# Patient Record
Sex: MALE | Race: BLACK OR AFRICAN AMERICAN | NOT HISPANIC OR LATINO | Employment: FULL TIME | URBAN - METROPOLITAN AREA
[De-identification: names, ages, dates, MRNs, and addresses within clinical notes are randomized per-mention and may not be internally consistent; named-entity substitution may affect disease eponyms.]

---

## 2017-04-13 ENCOUNTER — HOSPITAL ENCOUNTER (INPATIENT)
Facility: HOSPITAL | Age: 52
LOS: 3 days | Discharge: HOME | DRG: 896 | End: 2017-04-17
Attending: EMERGENCY MEDICINE | Admitting: HOSPITALIST
Payer: MEDICAID

## 2017-04-13 DIAGNOSIS — J81.0 ACUTE PULMONARY EDEMA: Primary | ICD-10-CM

## 2017-04-13 DIAGNOSIS — F14.929 COCAINE INTOXICATION WITH COMPLICATION: ICD-10-CM

## 2017-04-13 DIAGNOSIS — J96.02 ACUTE RESPIRATORY FAILURE WITH HYPOXIA AND HYPERCAPNIA: ICD-10-CM

## 2017-04-13 DIAGNOSIS — I50.20 SYSTOLIC CONGESTIVE HEART FAILURE: ICD-10-CM

## 2017-04-13 DIAGNOSIS — F14.929: ICD-10-CM

## 2017-04-13 DIAGNOSIS — F32.A DEPRESSION, UNSPECIFIED DEPRESSION TYPE: ICD-10-CM

## 2017-04-13 DIAGNOSIS — I50.9 ACUTE CONGESTIVE HEART FAILURE, UNSPECIFIED CONGESTIVE HEART FAILURE TYPE: ICD-10-CM

## 2017-04-13 DIAGNOSIS — N17.9 ACUTE KIDNEY INJURY: ICD-10-CM

## 2017-04-13 DIAGNOSIS — J96.02 ACUTE HYPERCAPNIC RESPIRATORY FAILURE: ICD-10-CM

## 2017-04-13 DIAGNOSIS — T88.4XXA: ICD-10-CM

## 2017-04-13 DIAGNOSIS — J96.01 ACUTE RESPIRATORY FAILURE WITH HYPOXIA AND HYPERCAPNIA: ICD-10-CM

## 2017-04-13 LAB
ABO + RH BLD: NORMAL
ALBUMIN SERPL BCP-MCNC: 3.7 G/DL
ALLENS TEST: ABNORMAL
ALLENS TEST: ABNORMAL
ALP SERPL-CCNC: 146 U/L
ALT SERPL W/O P-5'-P-CCNC: 127 U/L
AMPHET+METHAMPHET UR QL: NEGATIVE
ANION GAP SERPL CALC-SCNC: 14 MMOL/L
AST SERPL-CCNC: 193 U/L
BARBITURATES UR QL SCN>200 NG/ML: NEGATIVE
BASOPHILS # BLD AUTO: 0.07 K/UL
BASOPHILS NFR BLD: 0.6 %
BENZODIAZ UR QL SCN>200 NG/ML: NEGATIVE
BILIRUB SERPL-MCNC: 0.4 MG/DL
BLD GP AB SCN CELLS X3 SERPL QL: NORMAL
BNP SERPL-MCNC: 298 PG/ML
BUN SERPL-MCNC: 21 MG/DL
BZE UR QL SCN: NORMAL
CALCIUM SERPL-MCNC: 8.6 MG/DL
CANNABINOIDS UR QL SCN: NORMAL
CHLORIDE SERPL-SCNC: 108 MMOL/L
CO2 SERPL-SCNC: 19 MMOL/L
CREAT SERPL-MCNC: 1.7 MG/DL
CREAT UR-MCNC: 202.6 MG/DL
DELSYS: ABNORMAL
DELSYS: ABNORMAL
DIFFERENTIAL METHOD: ABNORMAL
EOSINOPHIL # BLD AUTO: 0.2 K/UL
EOSINOPHIL NFR BLD: 1.9 %
ERYTHROCYTE [DISTWIDTH] IN BLOOD BY AUTOMATED COUNT: 15.9 %
ERYTHROCYTE [SEDIMENTATION RATE] IN BLOOD BY WESTERGREN METHOD: 22 MM/H
EST. GFR  (AFRICAN AMERICAN): 52 ML/MIN/1.73 M^2
EST. GFR  (NON AFRICAN AMERICAN): 45 ML/MIN/1.73 M^2
ETHANOL SERPL-MCNC: <10 MG/DL
FIO2: 100
GLUCOSE SERPL-MCNC: 211 MG/DL
HCO3 UR-SCNC: 25.1 MMOL/L (ref 24–28)
HCO3 UR-SCNC: 27.3 MMOL/L (ref 24–28)
HCT VFR BLD AUTO: 40.8 %
HGB BLD-MCNC: 13.4 G/DL
INR PPP: 1
LYMPHOCYTES # BLD AUTO: 3.9 K/UL
LYMPHOCYTES NFR BLD: 30.9 %
MAGNESIUM SERPL-MCNC: 2 MG/DL
MCH RBC QN AUTO: 29.8 PG
MCHC RBC AUTO-ENTMCNC: 32.8 %
MCV RBC AUTO: 91 FL
METHADONE UR QL SCN>300 NG/ML: NEGATIVE
MIN VOL: 11.8
MODE: ABNORMAL
MONOCYTES # BLD AUTO: 0.9 K/UL
MONOCYTES NFR BLD: 6.9 %
NEUTROPHILS # BLD AUTO: 7.5 K/UL
NEUTROPHILS NFR BLD: 59.3 %
OPIATES UR QL SCN: NEGATIVE
PCO2 BLDA: 82.6 MMHG (ref 35–45)
PCO2 BLDA: 82.9 MMHG (ref 35–45)
PCP UR QL SCN>25 NG/ML: NEGATIVE
PEEP: 8
PH SMN: 7.09 [PH] (ref 7.35–7.45)
PH SMN: 7.13 [PH] (ref 7.35–7.45)
PHOSPHATE SERPL-MCNC: 5.7 MG/DL
PIP: 38
PLATELET # BLD AUTO: 326 K/UL
PMV BLD AUTO: 9.9 FL
PO2 BLDA: 149 MMHG (ref 80–100)
PO2 BLDA: 156 MMHG (ref 80–100)
POC BE: -2 MMOL/L
POC BE: -5 MMOL/L
POC SATURATED O2: 98 % (ref 95–100)
POC SATURATED O2: 98 % (ref 95–100)
POC TCO2: 28 MMOL/L (ref 23–27)
POC TCO2: 30 MMOL/L (ref 23–27)
POTASSIUM SERPL-SCNC: 4.2 MMOL/L
PROT SERPL-MCNC: 7.9 G/DL
PROTHROMBIN TIME: 10.5 SEC
RBC # BLD AUTO: 4.49 M/UL
SAMPLE: ABNORMAL
SAMPLE: ABNORMAL
SITE: ABNORMAL
SITE: ABNORMAL
SODIUM SERPL-SCNC: 141 MMOL/L
TOXICOLOGY INFORMATION: NORMAL
TROPONIN I SERPL DL<=0.01 NG/ML-MCNC: 0.1 NG/ML
TSH SERPL DL<=0.005 MIU/L-ACNC: 2.34 UIU/ML
VT: 400
WBC # BLD AUTO: 12.59 K/UL

## 2017-04-13 PROCEDURE — 83880 ASSAY OF NATRIURETIC PEPTIDE: CPT

## 2017-04-13 PROCEDURE — 85025 COMPLETE CBC W/AUTO DIFF WBC: CPT

## 2017-04-13 PROCEDURE — 96367 TX/PROPH/DG ADDL SEQ IV INF: CPT

## 2017-04-13 PROCEDURE — 84100 ASSAY OF PHOSPHORUS: CPT

## 2017-04-13 PROCEDURE — 82962 GLUCOSE BLOOD TEST: CPT

## 2017-04-13 PROCEDURE — 84443 ASSAY THYROID STIM HORMONE: CPT

## 2017-04-13 PROCEDURE — 84484 ASSAY OF TROPONIN QUANT: CPT

## 2017-04-13 PROCEDURE — 63600175 PHARM REV CODE 636 W HCPCS: Performed by: EMERGENCY MEDICINE

## 2017-04-13 PROCEDURE — 80320 DRUG SCREEN QUANTALCOHOLS: CPT

## 2017-04-13 PROCEDURE — 20000000 HC ICU ROOM

## 2017-04-13 PROCEDURE — 99291 CRITICAL CARE FIRST HOUR: CPT | Mod: 25

## 2017-04-13 PROCEDURE — 86900 BLOOD TYPING SEROLOGIC ABO: CPT

## 2017-04-13 PROCEDURE — 86901 BLOOD TYPING SEROLOGIC RH(D): CPT

## 2017-04-13 PROCEDURE — 83735 ASSAY OF MAGNESIUM: CPT

## 2017-04-13 PROCEDURE — 82570 ASSAY OF URINE CREATININE: CPT

## 2017-04-13 PROCEDURE — 96365 THER/PROPH/DIAG IV INF INIT: CPT

## 2017-04-13 PROCEDURE — 25000003 PHARM REV CODE 250: Performed by: EMERGENCY MEDICINE

## 2017-04-13 PROCEDURE — 80053 COMPREHEN METABOLIC PANEL: CPT

## 2017-04-13 PROCEDURE — 85610 PROTHROMBIN TIME: CPT

## 2017-04-13 PROCEDURE — 87040 BLOOD CULTURE FOR BACTERIA: CPT

## 2017-04-13 PROCEDURE — 96366 THER/PROPH/DIAG IV INF ADDON: CPT

## 2017-04-13 PROCEDURE — 96375 TX/PRO/DX INJ NEW DRUG ADDON: CPT

## 2017-04-13 PROCEDURE — 80307 DRUG TEST PRSMV CHEM ANLYZR: CPT

## 2017-04-13 PROCEDURE — 31500 INSERT EMERGENCY AIRWAY: CPT

## 2017-04-13 PROCEDURE — 94002 VENT MGMT INPAT INIT DAY: CPT

## 2017-04-13 PROCEDURE — 63600175 PHARM REV CODE 636 W HCPCS

## 2017-04-13 PROCEDURE — 96376 TX/PRO/DX INJ SAME DRUG ADON: CPT

## 2017-04-13 RX ORDER — FENTANYL CITRATE 50 UG/ML
INJECTION, SOLUTION INTRAMUSCULAR; INTRAVENOUS
Status: DISPENSED
Start: 2017-04-13 | End: 2017-04-14

## 2017-04-13 RX ORDER — PROPOFOL 10 MG/ML
100 VIAL (ML) INTRAVENOUS
Status: COMPLETED | OUTPATIENT
Start: 2017-04-13 | End: 2017-04-13

## 2017-04-13 RX ORDER — FENTANYL CITRATE 50 UG/ML
INJECTION, SOLUTION INTRAMUSCULAR; INTRAVENOUS
Status: COMPLETED
Start: 2017-04-13 | End: 2017-04-13

## 2017-04-13 RX ORDER — FENTANYL CITRATE 50 UG/ML
100 INJECTION, SOLUTION INTRAMUSCULAR; INTRAVENOUS
Status: COMPLETED | OUTPATIENT
Start: 2017-04-13 | End: 2017-04-13

## 2017-04-13 RX ORDER — ROCURONIUM BROMIDE 10 MG/ML
80 INJECTION, SOLUTION INTRAVENOUS ONCE
Status: COMPLETED | OUTPATIENT
Start: 2017-04-13 | End: 2017-04-13

## 2017-04-13 RX ORDER — PROPOFOL 10 MG/ML
10 INJECTION, EMULSION INTRAVENOUS
Status: COMPLETED | OUTPATIENT
Start: 2017-04-13 | End: 2017-04-13

## 2017-04-13 RX ADMIN — ROCURONIUM BROMIDE 50 MG: 10 INJECTION, SOLUTION INTRAVENOUS at 10:04

## 2017-04-13 RX ADMIN — PROPOFOL 10 MCG/KG/MIN: 10 INJECTION, EMULSION INTRAVENOUS at 09:04

## 2017-04-13 RX ADMIN — MIDAZOLAM 2 MG/HR: 5 INJECTION INTRAMUSCULAR; INTRAVENOUS at 10:04

## 2017-04-13 RX ADMIN — NITROGLYCERIN 2 INCH: 20 OINTMENT TOPICAL at 11:04

## 2017-04-13 RX ADMIN — FENTANYL CITRATE 100 MCG: 50 INJECTION, SOLUTION INTRAMUSCULAR; INTRAVENOUS at 11:04

## 2017-04-13 RX ADMIN — PROPOFOL 100 MG: 10 INJECTION, EMULSION INTRAVENOUS at 09:04

## 2017-04-13 NOTE — IP AVS SNAPSHOT
\[facility]\""  [address]  [place] [address]  Phone: [phone]           Patient Discharge Instructions   Our goal is to set you up for success. This packet includes information on your condition, medications, and your home care.  It will help you care for yourself to prevent having to return to the hospital.     Please ask your nurse if you have any questions.      There are many details to remember when preparing to leave the hospital. Here is what you will need to do:    1. Take your medicine. If you are prescribed medications, review your Medication List on the following pages. You may have new medications to  at the pharmacy and others that you'll need to stop taking. Review the instructions for how and when to take your medications. Talk with your doctor or nurses if you are unsure of what to do.     2. Go to your follow-up appointments. Specific follow-up information is listed in the following pages. Your may be contacted by a nurse or clinical provider about future appointments. Be sure we have all of the phone numbers to reach you. Please contact your provider's office if you are unable to make an appointment.     3. Watch for warning signs. Your doctor or nurse will give you detailed warning signs to watch for and when to call for assistance. These instructions may also include educational information about your condition. If you experience any of warning signs to your health, call your doctor.           [facility]  Unless otherwise directed by your provider, please   contact [facility], our nurse care line   that is available for 24/7 assistance.     [phone] (toll-free)     Registered nurses in the [facility]   provide: appointment scheduling, clinical advisement, health education, and other advisory services.                  ** Verify the list of medication(s) below is accurate and up to date. Carry this with you in case of emergency. If your  medications have changed, please notify your healthcare provider.             Medication List      START taking these medications        Additional Info                      carvedilol 6.25 MG tablet   Commonly known as:  COREG   Quantity:  60 tablet   Refills:  3   Dose:  6.25 mg    Last time this was given:  6.25 mg on 4/17/2017  8:24 AM   Instructions:  Take 1 tablet (6.25 mg total) by mouth 2 (two) times daily.     Begin Date    AM    Noon    PM    Bedtime       furosemide 40 MG tablet   Commonly known as:  LASIX   Quantity:  30 tablet   Refills:  3   Dose:  40 mg    Last time this was given:  40 mg on 4/17/2017  8:24 AM   Instructions:  Take 1 tablet (40 mg total) by mouth once daily.     Begin Date    AM    Noon    PM    Bedtime       lisinopril 2.5 MG tablet   Commonly known as:  PRINIVIL,ZESTRIL   Quantity:  30 tablet   Refills:  3   Dose:  2.5 mg    Instructions:  Take 1 tablet (2.5 mg total) by mouth once daily.     Begin Date    AM    Noon    PM    Bedtime       sertraline 50 MG tablet   Commonly known as:  ZOLOFT   Quantity:  30 tablet   Refills:  3   Dose:  50 mg    Start Date:  4/18/2017   Last time this was given:  25 mg on 4/17/2017  8:24 AM   Instructions:  Take 1 tablet (50 mg total) by mouth once daily.     Begin Date    AM    Noon    PM    Bedtime            Where to Get Your Medications      These medications were sent to [facility] - [address] [facility] [address]  [address] [facility] [address], [place] [address]     Phone:  [phone]     carvedilol 6.25 MG tablet    furosemide 40 MG tablet    lisinopril 2.5 MG tablet    sertraline 50 MG tablet                  Please bring to all follow up appointments:    1. A copy of your discharge instructions.  2. All medicines you are currently taking in their original bottles.  3. Identification and insurance card.    Please arrive 15 minutes ahead of scheduled appointment time.    Please call 24 hours in advance if  you must reschedule your appointment and/or time.        Follow-up Information     Follow up with [facility] On 4/20/2017.    Why:  @10:20am    Contact information:    [address]  [facility] [id number] [phone]          Discharge Instructions     Future Orders    Activity as tolerated     Call MD for:  difficulty breathing or increased cough     Call MD for:  increased confusion or weakness     Call MD for:  persistent dizziness, light-headedness, or visual disturbances     Call MD for:  persistent nausea and vomiting or diarrhea     Call MD for:  severe uncontrolled pain     Diet general     Questions:    Total calories:      Fat restriction, if any:      Protein restriction, if any:      Na restriction, if any:  2gNa    Fluid restriction:      Additional restrictions:  Cardiac (Low Na/Chol)        Discharge Instructions       HEART FAILURE, DISCHARGE INSTRUCTIONS FOR (ENGLISH) View Edit Remove   HEART FAILURE, COPING WITH (ENGLISH) View Edit Remove   HEART FAILURE: WARNING SIGNS OF A FLARE-UP (ENGLISH) View Edit Remove   SERTRALINE TABLETS (ENGLISH) View Edit Remove   FUROSEMIDE TABLETS (ENGLISH) View Edit Remove   CARVEDILOL TABLETS (ENGLISH) View Edit Remove   LISINOPRIL TABLETS (ENGLISH) View Edit Remove         Discharge References/Attachments     HEART FAILURE, DISCHARGE INSTRUCTIONS FOR (ENGLISH)    HEART FAILURE, COPING WITH (ENGLISH)    HEART FAILURE: WARNING SIGNS OF A FLARE-UP (ENGLISH)    SERTRALINE TABLETS (ENGLISH)    FUROSEMIDE TABLETS (ENGLISH)    CARVEDILOL TABLETS (ENGLISH)    LISINOPRIL TABLETS (ENGLISH)        Primary Diagnosis     Your primary diagnosis was:  Cocaine Intoxication With Complication      Admission Information     Date & Time Provider Department [facility]    4/13/2017  8:46 PM Russel Blanchard MD [facility] [id number]      Care Providers     Provider Role Specialty Primary office phone    Russel Blanchard MD Attending Provider  "Hospitalist [phone]    Aleks Macias MD Physician  Hospitalist  [phone]    Robin Chandra MD Consulting Physician  Cardiology [phone]    Balbir Vivas MD Consulting Physician  Pulmonary Disease [phone]      Your Vitals Were     BP Pulse Temp Resp Height Weight    117/84 90 98.3 °F (36.8 °C) 19 5' 8" (1.727 m) 67.3 kg (148 lb 6.4 oz)    SpO2 BMI             95% 22.56 kg/m2         Recent Lab Values     No lab values to display.      Pending Labs     Order Current Status    Blood culture x two cultures. Draw prior to antibiotics. Preliminary result    Blood culture x two cultures. Draw prior to antibiotics. Preliminary result      Allergies as of 4/17/2017     No Known Allergies      Advance Directives     An advance directive is a document which, in the event you are no longer able to make decisions for yourself, tells your healthcare team what kind of treatment you do or do not want to receive, or who you would like to make those decisions for you.  If you do not currently have an advance directive, [facility] encourages you to create one.  For more information call:  [phone]), [phone] ([phone]),  or log on to [email or web address].        Smoking Cessation     If you would like to quit smoking:   You may be eligible for free services if you are a [place] resident and started smoking cigarettes before September 1, 1988.  Call the Smoking Cessation Trust ([facility]) toll free at ([phone] or [phone] 272-1551.   Call -800-QUIT-NOW if you do not meet the above criteria.   Contact us via email: [email or web address]   View our website for more information: [email or web address]        Language Assistance Services     ATTENTION: Language assistance services are available, free of charge. Please call [phone].      ATENCIÓN: Si habla español, tiene a gutierrez disposición servicios gratuitos de asistencia lingüística. Llame al [phone].     CHÚ " Ý: N?u b?n nói Ti?ng Vi?t, có các d?ch v? h? tr? ngôn ng? mi?n phí dành cho b?n. G?i s? [phone].        Heart Failure Education       Heart Failure: Being Active  You have a condition called heart failure. Being active doesnt mean that you have to wear yourself out. Even a little movement each day helps to strengthen your heart. If you cant get out to exercise, you can do simple stretching and strengthening exercises at home. These are good ways to keep you well-conditioned and prevent you and your heart from becoming excessively weak.    Ideas to get you started  · Add a little movement to things you do now. Walk to mail letters. Park your car at the far end of the parking lot and walk to the store. Walk up a flight of stairs instead of taking the elevator.  · Choose activities you enjoy. You might walk, swim, or ride an exercise bike. Things like gardening and washing the car count, too. Other possibilities include: washing dishes, walking the dog, walking around the mall, and doing aerobic activities with friends.  · Join a group exercise program at a [facility] or [facility], a senior center, or a community center. Or look into a hospital cardiac rehabilitation program. Ask your doctor if you qualify.  Tips to keep you going  · Get up and get dressed each day. Go to a coffee shop and read a newspaper or go somewhere that you'll be in the presence of other active people. Youll feel more like being active.  · Make a plan. Choose one or more activities that you enjoy and that you can easily do. Then plan to do at least one each day. You might write your plan on a calendar.  · Go with a friend or a group if you like company. This can help you feel supported and stay motivated, too.  · Plan social events that you enjoy. This will keep you mentally engaged as well as physically motivated to do things you find pleasure in.  For your safety  · Talk with your healthcare provider before starting an exercise  program.  · Exercise indoors when its too hot or too cold outside, or when the air quality is poor. Try walking at a shopping mall.  · Wear socks and sturdy shoes to maintain your balance and prevent falls.  · Start slowly. Do a few minutes several times a day at first. Increase your time and speed little by little.  · Stop and rest whenever you feel tired or get short of breath.  · Dont push yourself on days when you dont feel well.  Date Last Reviewed: 3/20/2016  © 4952-5515 "[employer]. [address] [place] [address]. All rights reserved. This information is not intended as a substitute for professional medical care. Always follow your healthcare professional's instructions.              Heart Failure: Evaluating Your Heart  You have a condition called heart failure. To evaluate your condition, your doctor will examine you, ask questions, and do some tests. Along with looking for signs of heart failure, the doctor looks for any other health problems that may have led to heart failure. The results of your evaluation will help your doctor form a treatment plan.  Health history and physical exam  Your visit will start with a health history. Tell the doctor about any symptoms youve noticed and about all medicines you take. Then youll have a physical exam. This includes listening to your heartbeat and breathing. Youll also be checked for swelling (edema) in your legs and neck. When you have fluid buildup or fluid in the lungs, it may be called congestive heart failure.  Diagnosing heart failure     During an echocardiogram, sound waves bounce off the heart. These are converted into a picture on the screen.   The following may be done to help your doctor form a diagnosis:  · X-rays show the size and shape of your heart. These pictures can also show fluid in your lungs.  · An electrocardiogram (ECG or EKG) shows the pattern of your heartbeat. Small pads (electrodes) are placed on your  chest, arms, and legs. Wires connect the pads to the ECG machine, which records your hearts electrical signals. This can give the doctor information about heart function.  · An echocardiogram uses ultrasound waves to show the structure and movement of your heart muscle. This shows how well the heart pumps. It also shows the thickness of the heart walls, and if the heart is enlarged. It is one of the most useful, non-invasive tests as it provides information about the heart's general function. This helps your doctor make treatment decisions.  · Lab tests evaluate small amounts of blood or urine for signs of problems. A BNP lab test can help diagnose and evaluate heart failure. BNP stands for B-type natriuretic peptide. The ventricles secrete more BNP when heart failure worsens. Lab tests can also provide information about metabolic dysfunction or heart dysfunction.  Your treatment plan  Based on the results of your evaluation and tests, your doctor will develop a treatment plan. This plan is designed to relieve some of your heart failure symptoms and help make you more comfortable. Your treatment plan may include:  · Medicine to help your heart work better and improve your quality of life  · Changes in what you eat and drink to help prevent fluid from backing up in your body  · Daily monitoring of your weight and heart failure symptoms to see how well your treatment plan is working  · Exercise to help you stay healthy  · Help with quitting smoking  · Emotional and psychological support to help adjust to the changes  · Referrals to other specialists to make sure you are being treated comprehensively  Date Last Reviewed: 3/21/2016  © 5643-9046 [employer]. [address] [place] [address], [place] [address]. All rights reserved. This information is not intended as a substitute for professional medical care. Always follow your healthcare professional's instructions.              Heart Failure: Making Changes to  Your Diet  You have a condition called heart failure. When you have heart failure, excess fluid is more likely to build up in your body because your heart isn't working well. This makes the heart work harder to pump blood. Fluid buildup causes symptoms such as shortness of breath and swelling (edema). This is often referred to as congestive heart failure or CHF. Controlling the amount of salt (sodium) you eat may help stop fluid from building up. Your doctor may also tell you to reduce the amount of fluid you drink.  Reading food labels    Your healthcare provider will tell you how much sodium you can eat each day. Read food labels to keep track. Keep in mind that certain foods are high in salt. These include canned, frozen, and processed foods. Check the amount of sodium in each serving. Watch out for high-sodium ingredients. These include MSG (monosodium glutamate), baking soda, and sodium phosphate.   Eating less salt  Give yourself time to get used to eating less salt. It may take a little while. Here are some tips to help:  · Take the saltshaker off the table. Replace it with salt-free herb mixes and spices.  · Eat fresh or plain frozen vegetables. These have much less salt than canned vegetables.  · Choose low-sodium snacks like sodium-free pretzels, crackers, or air-popped popcorn.  · Dont add salt to your food when youre cooking. Instead, season your foods with pepper, lemon, garlic, or onion.  · When you eat out, ask that your food be cooked without added salt.  · Avoid eating fried foods as these often have a great deal of salt.  If youre told to limit fluids  You may need to limit how much fluid you have to help prevent swelling. This includes anything that is liquid at room temperature, such as ice cream and soup. If your doctor tells you to limit fluid, try these tips:  · Measure drinks in a measuring cup before you drink them. This will help you meet daily goals.  · Chill drinks to make them more  refreshing.  · Suck on frozen lemon wedges to quench thirst.  · Only drink when youre thirsty.  · Chew sugarless gum or suck on hard candy to keep your mouth moist.  · Weigh yourself daily to know if your body's fluid content is rising.  My sodium goal  Your healthcare provider may give you a sodium goal to meet each day. This includes sodium found in food as well as salt that you add. My goal is to eat no more than ___________ mg of sodium per day.     When to call your doctor  Call your doctor right away if you have any symptoms of worsening heart failure. These can include:  · Sudden weight gain  · Increased swelling of your legs or ankles  · Trouble breathing when youre resting or at night  · Increase in the number of pillows you have to sleep on  · Chest pain, pressure, discomfort, or pain in the jaw, neck, or back   Date Last Reviewed: 3/21/2016  © 2433-1611 [employer]. [address] [place] [address]. All rights reserved. This information is not intended as a substitute for professional medical care. Always follow your healthcare professional's instructions.              Heart Failure: Medicines to Help Your Heart    You have a condition called heart failure (also known as congestive heart failure, or CHF). Your doctor will likely prescribe medicines for heart failure and any underlying health problems you have. Most heart failure patients take one or more types of medicinen. Your healthcare provider will work to find the combination of medicines that works best for you.  Heart failure medicines  Here are the most common heart failure medicines:  · ACE inhibitors lower blood pressure and decrease strain on the heart. This makes it easier for the heart to pump. Angiotensin receptor blockers have similar effects. These are prescribed for some patients instead of ACE inhibitors.  · Beta-blockers relieve stress on the heart. They also improve symptoms. They may also improve the heart's  pumping action over time.  · Diuretics (also called water pills) help rid your body of excess water. This can help rid your body of swelling (edema). Having less fluid to pump means your heart doesnt have to work as hard. Some diuretics make your body lose a mineral called potassium. Your doctor will tell you if you need to take supplements or eat more foods high in potassium.  · Digoxin helps your heart pump with more strength. This helps your heart pump more blood with each beat. So, more oxygen-rich blood travels to the rest of the body.  · Aldosterone antagonists help alter hormones and decrease strain on the heart.  · Hydralazine and nitrates are two separate medicines used together to treat heart failure. They may come in one combination pill. They lower blood pressure and decrease how hard the heart has to pump.  Medicines for related conditions  Controlling other heart problems helps keep heart failure under control, too. Depending on other heart problems you have, medicines may be prescribed to:  · Lower blood pressure (antihypertensives).  · Lower cholesterol levels (statins).  · Prevent blood clots (anticoagulants or aspirin).  · Keep the heartbeat steady (antiarrhythmics).  Date Last Reviewed: 3/5/2016  © 5370-6580 [employer]. [address] [place] [address], [place] [address]. All rights reserved. This information is not intended as a substitute for professional medical care. Always follow your healthcare professional's instructions.              Heart Failure: Procedures That May Help    The heart is a muscle that pumps oxygen-rich blood to all parts of the body. When you have heart failure, the heart is not able to pump as well as it should. Blood and fluid may back up into the lungs (congestive heart failure), and some parts of the body dont get enough oxygen-rich blood to work normally. These problems lead to the symptoms of heart failure.     Certain procedures may help the heart pump  better in some cases of heart failure. Some procedures are done to treat health problems that may have caused the heart failure such as coronary artery disease or heart rhythm problems. For more serious heart failure, other options are available.  Treating artery and valve problems  If you have coronary artery disease or valve disease, procedures may be done to improve blood flow. This helps the heart pump better, which can improve heart failure symptoms. First, your doctor may do a cardiac catheterization to help detect clogged blood vessels or valve damage. During this procedure, a  thin tube (catheter) in inserted into a blood vessel and guided to the heart. There a dye is injected and a special type of X-ray (angiogram) is taken of the blood vessels. Procedures to open a blocked artery or fix damaged valves can also be done using catheterization.  · Angioplasty uses a balloon-tipped instrument at the end of the catheter. The balloon is inflated to widen the narrowed artery. In many cases, a stent is expanded to further support the narrowed artery. A stent is a metal mesh tube.  · Valve surgery repairs or replacement of faulty valves can also be done during catheterization so blood can flow properly through the chambers of the heart.  Bypass surgery is another option to help treat blocked arteries. It uses a healthy blood vessel from elsewhere in the body. The healthy blood vessel is attached above and below the blocked area so that blood can flow around the blocked artery.  Treating heart rhythm problems  A device may be placed in the chest to help a weak heart maintain a healthy, heartbeat so the heart can pump more effectively:  · Pacemaker. A pacemaker is an implanted device that regulates your heartbeat electronically. It monitors your heart's rhythm and generates a painless electric impulse that helps the heart beat in a regular rhythm. A pacemaker is programmed to meet your specific heart rhythm  needs.  · Biventricular pacing/cardiac resynchronization therapy. A type of pacemaker that paces both pumping chambers of the heart at the same time to coordinate contractions and to improve the heart's function. Some people with heart failure are candidates for this therapy.  · Implantable cardioverter defibrillator. A device similar to a pacemaker that senses when the heart is beating too fast and delivers an electrical shock to convert the fast rhythm to a normal rhythm. This can be a life saving device.  In severe cases  In more serious cases of heart failure when other treatments no longer work, other options may include:  · Ventricular assist devices (VADs). These are mechanical devices used to take over the pumping function for one or both of the heart's ventricles, or pumping chambers. A VAD may be necessary when heart failure progresses to the point that medicines and other treatments no longer help. In some cases, a VAD may be used as a bridge to transplant.  · Heart transplant. This is replacing the diseased heart with a healthy one from a donor. This is an option for a few people who are very sick. A heart transplant is very serious and not an option for all patients. Your doctor can tell you more.  Date Last Reviewed: 3/20/2016  © 5117-6830 [employer]. [address] [place] [address], [place] [address]. All rights reserved. This information is not intended as a substitute for professional medical care. Always follow your healthcare professional's instructions.              Heart Failure: Tracking Your Weight  You have a condition called heart failure. When you have heart failure, a sudden weight gain or a steady rise in weight is a warning sign that your body is retaining too much water and salt. This could mean your heart failure is getting worse. If left untreated, it can cause problems for your lungs and result in shortness of breath. Weighing yourself each day is the best way to know if youre  retaining water. If your weight goes up quickly, call your doctor. You will be given instructions on how to get rid of the excess water. You will likely need medicines and to avoid salt. This will help your heart work better.  Call your doctor if you gain more than 2 pounds in 1 day, more than 5 pounds in 1 week, or whatever weight gain you were told to report by your doctor. This is often a sign of worsening heart failure and needs to be evaluated and treated. Your doctor will tell you what to do next.   Tips for weighing yourself    · Weigh yourself at the same time each morning, wearing the same clothes. Weigh yourself after urinating and before eating.  · Use the same scale each day. Make sure the numbers are easy to read. Put the scale on a flat, hard surface -- not on a rug or carpet.  · Do not stop weighing yourself. If you forget one day, weigh again the next morning.  How to use your weight chart  · Keep your weight chart near the scale. Write your weight on the chart as soon as you get off the scale.  · Fill in the month and the start date on the chart. Then write down your weight each day. Your chart will look like this:    · If you miss a day, leave the space blank. Weigh yourself the next day and write your weight in the next space.  · Take your weight chart with you when you go to see your doctor.  Date Last Reviewed: 3/20/2016  © 1476-5921 [employer]. [address] [place] [address], [place] [address]. All rights reserved. This information is not intended as a substitute for professional medical care. Always follow your healthcare professional's instructions.              Heart Failure: Warning Signs of a Flare-Up  You have a condition called heart failure. Once you have heart failure, flare-ups can happen. Below are signs that can mean your heart failure is getting worse. If you notice any of these warning signs, call your healthcare provider.  Swelling    · Your feet, ankles, or lower legs get  puffier.  · You notice skin changes on your lower legs.  · Your shoes feel too tight.  · Your clothes are tighter in the waist.  · You have trouble getting rings on or off your fingers.  Shortness of breath  · You have to breathe harder even when youre doing your normal activities or when youre resting.  · You are short of breath walking up stairs or even short distances.  · You wake up at night short of breath or coughing.  · You need to use more pillows or sit up to sleep.  · You wake up tired or restless.  Other warning signs  · You feel weaker, dizzy, or more tired.  · You have chest pain or changes in your heartbeat.  · You have a cough that wont go away.  · You cant remember things or dont feel like eating.  Tracking your weight  Gaining weight is often the first warning sign that heart failure is getting worse. Gaining even a few pounds can be a sign that your body is retaining excess water and salt. Weighing yourself each day in the morning after you urinate and before you eat, is the best way to know if you're retaining water. Get a scale that is easy to read and make sure you wear the same clothes and use the same scale every time you weigh. Your healthcare provider will show you how to track your weight. Call your doctor if you gain more than 2 pounds in 1 day, 5 pounds in 1 week, or whatever weight gain you were told to report by your doctor. This is often a sign of worsening heart failure and needs to be evaluated and treated before it compromises your breathing. Your doctor will tell you what to do next.    Date Last Reviewed: 3/15/2016  © 9057-9402 [employer]. [address] [place] [address], [place] [address]. All rights reserved. This information is not intended as a substitute for professional medical care. Always follow your healthcare professional's instructions.              [facility] Sign-Up     Activating your [facility] account is as easy as 1-2-3!     1) Visit [email or web address],  select Sign Up Now, enter this activation code and your date of birth, then select Next.  [email or web address]  Expires: 6/1/2017 11:14 AM      2) Create a username and password to use when you visit [facility] in the future and select a security question in case you lose your password and select Next.    3) Enter your e-mail address and click Sign Up!    Additional Information  If you have questions, please e-mail [email or web address] or call [phone] to talk to our [facility] staff. Remember, [facility] is NOT to be used for urgent needs. For medical emergencies, dial 911.          [facility] complies with applicable Federal civil rights laws and does not discriminate on the basis of race, color, national origin, age, disability, or sex.

## 2017-04-14 PROBLEM — I50.9 ACUTE CONGESTIVE HEART FAILURE: Status: ACTIVE | Noted: 2017-04-14

## 2017-04-14 PROBLEM — J96.02 ACUTE HYPERCAPNIC RESPIRATORY FAILURE: Status: ACTIVE | Noted: 2017-04-14

## 2017-04-14 PROBLEM — F14.10 COCAINE ABUSE: Chronic | Status: ACTIVE | Noted: 2017-04-14

## 2017-04-14 PROBLEM — F17.210 CIGARETTE SMOKER: Chronic | Status: ACTIVE | Noted: 2017-04-14

## 2017-04-14 PROBLEM — R74.01 ELEVATED TRANSAMINASE LEVEL: Status: ACTIVE | Noted: 2017-04-14

## 2017-04-14 PROBLEM — N17.9 ACUTE KIDNEY INJURY: Status: ACTIVE | Noted: 2017-04-14

## 2017-04-14 PROBLEM — F14.929: Status: ACTIVE | Noted: 2017-04-14

## 2017-04-14 PROBLEM — I50.21 ACUTE SYSTOLIC CONGESTIVE HEART FAILURE: Status: ACTIVE | Noted: 2017-04-14

## 2017-04-14 PROBLEM — I21.4 NSTEMI (NON-ST ELEVATED MYOCARDIAL INFARCTION): Status: ACTIVE | Noted: 2017-04-14

## 2017-04-14 LAB
ALBUMIN SERPL BCP-MCNC: 3.6 G/DL
ALLENS TEST: ABNORMAL
ALP SERPL-CCNC: 127 U/L
ALT SERPL W/O P-5'-P-CCNC: 106 U/L
ANION GAP SERPL CALC-SCNC: 9 MMOL/L
AST SERPL-CCNC: 114 U/L
BILIRUB SERPL-MCNC: 0.4 MG/DL
BUN SERPL-MCNC: 24 MG/DL
CALCIUM SERPL-MCNC: 8.7 MG/DL
CHLORIDE SERPL-SCNC: 105 MMOL/L
CO2 SERPL-SCNC: 26 MMOL/L
CREAT SERPL-MCNC: 1.5 MG/DL
DELSYS: ABNORMAL
ERYTHROCYTE [SEDIMENTATION RATE] IN BLOOD BY WESTERGREN METHOD: 22 MM/H
EST. GFR  (AFRICAN AMERICAN): >60 ML/MIN/1.73 M^2
EST. GFR  (NON AFRICAN AMERICAN): 53 ML/MIN/1.73 M^2
ESTIMATED PA SYSTOLIC PRESSURE: 30.09
FIO2: 60
GLOBAL PERICARDIAL EFFUSION: ABNORMAL
GLUCOSE SERPL-MCNC: 87 MG/DL
HCO3 UR-SCNC: 27.4 MMOL/L (ref 24–28)
MAGNESIUM SERPL-MCNC: 2.2 MG/DL
MITRAL VALVE REGURGITATION: ABNORMAL
MODE: ABNORMAL
PCO2 BLDA: 48.1 MMHG (ref 35–45)
PEEP: 5
PH SMN: 7.36 [PH] (ref 7.35–7.45)
PHOSPHATE SERPL-MCNC: 4.3 MG/DL
PO2 BLDA: 78 MMHG (ref 80–100)
POC BE: 2 MMOL/L
POC SATURATED O2: 95 % (ref 95–100)
POC TCO2: 29 MMOL/L (ref 23–27)
POCT GLUCOSE: 192 MG/DL (ref 70–110)
POCT GLUCOSE: 230 MG/DL (ref 70–110)
POTASSIUM SERPL-SCNC: 5.3 MMOL/L
PROT SERPL-MCNC: 7.4 G/DL
RETIRED EF AND QEF - SEE NOTES: 15 (ref 55–65)
SAMPLE: ABNORMAL
SITE: ABNORMAL
SODIUM SERPL-SCNC: 140 MMOL/L
TRICUSPID VALVE REGURGITATION: ABNORMAL
TROPONIN I SERPL DL<=0.01 NG/ML-MCNC: 0.17 NG/ML
TROPONIN I SERPL DL<=0.01 NG/ML-MCNC: 0.25 NG/ML
VT: 400

## 2017-04-14 PROCEDURE — 63600175 PHARM REV CODE 636 W HCPCS: Performed by: INTERNAL MEDICINE

## 2017-04-14 PROCEDURE — 25000003 PHARM REV CODE 250: Performed by: HOSPITALIST

## 2017-04-14 PROCEDURE — 84100 ASSAY OF PHOSPHORUS: CPT

## 2017-04-14 PROCEDURE — 25000003 PHARM REV CODE 250: Performed by: INTERNAL MEDICINE

## 2017-04-14 PROCEDURE — 99233 SBSQ HOSP IP/OBS HIGH 50: CPT | Mod: ,,, | Performed by: INTERNAL MEDICINE

## 2017-04-14 PROCEDURE — 94003 VENT MGMT INPAT SUBQ DAY: CPT

## 2017-04-14 PROCEDURE — 20000000 HC ICU ROOM

## 2017-04-14 PROCEDURE — 25000003 PHARM REV CODE 250: Performed by: EMERGENCY MEDICINE

## 2017-04-14 PROCEDURE — 96375 TX/PRO/DX INJ NEW DRUG ADDON: CPT

## 2017-04-14 PROCEDURE — 27000221 HC OXYGEN, UP TO 24 HOURS

## 2017-04-14 PROCEDURE — 63600175 PHARM REV CODE 636 W HCPCS: Performed by: EMERGENCY MEDICINE

## 2017-04-14 PROCEDURE — 84484 ASSAY OF TROPONIN QUANT: CPT

## 2017-04-14 PROCEDURE — 80053 COMPREHEN METABOLIC PANEL: CPT

## 2017-04-14 PROCEDURE — 5A1945Z RESPIRATORY VENTILATION, 24-96 CONSECUTIVE HOURS: ICD-10-PCS | Performed by: HOSPITALIST

## 2017-04-14 PROCEDURE — 36415 COLL VENOUS BLD VENIPUNCTURE: CPT

## 2017-04-14 PROCEDURE — 0BH17EZ INSERTION OF ENDOTRACHEAL AIRWAY INTO TRACHEA, VIA NATURAL OR ARTIFICIAL OPENING: ICD-10-PCS | Performed by: HOSPITALIST

## 2017-04-14 PROCEDURE — 96376 TX/PRO/DX INJ SAME DRUG ADON: CPT

## 2017-04-14 PROCEDURE — 93010 ELECTROCARDIOGRAM REPORT: CPT | Mod: ,,, | Performed by: INTERNAL MEDICINE

## 2017-04-14 PROCEDURE — 82803 BLOOD GASES ANY COMBINATION: CPT

## 2017-04-14 PROCEDURE — 93306 TTE W/DOPPLER COMPLETE: CPT

## 2017-04-14 PROCEDURE — 27100171 HC OXYGEN HIGH FLOW UP TO 24 HOURS

## 2017-04-14 PROCEDURE — 94761 N-INVAS EAR/PLS OXIMETRY MLT: CPT

## 2017-04-14 PROCEDURE — 63600175 PHARM REV CODE 636 W HCPCS: Performed by: HOSPITALIST

## 2017-04-14 PROCEDURE — 93005 ELECTROCARDIOGRAM TRACING: CPT

## 2017-04-14 PROCEDURE — 83735 ASSAY OF MAGNESIUM: CPT

## 2017-04-14 PROCEDURE — 36600 WITHDRAWAL OF ARTERIAL BLOOD: CPT

## 2017-04-14 PROCEDURE — 93306 TTE W/DOPPLER COMPLETE: CPT | Mod: 26,,, | Performed by: INTERNAL MEDICINE

## 2017-04-14 RX ORDER — IBUPROFEN 200 MG
24 TABLET ORAL
Status: DISCONTINUED | OUTPATIENT
Start: 2017-04-14 | End: 2017-04-17 | Stop reason: HOSPADM

## 2017-04-14 RX ORDER — CARVEDILOL 3.12 MG/1
3.12 TABLET ORAL 2 TIMES DAILY
Status: DISCONTINUED | OUTPATIENT
Start: 2017-04-14 | End: 2017-04-17

## 2017-04-14 RX ORDER — PROPOFOL 10 MG/ML
20 INJECTION, EMULSION INTRAVENOUS
Status: COMPLETED | OUTPATIENT
Start: 2017-04-14 | End: 2017-04-14

## 2017-04-14 RX ORDER — ENOXAPARIN SODIUM 100 MG/ML
40 INJECTION SUBCUTANEOUS EVERY 24 HOURS
Status: DISCONTINUED | OUTPATIENT
Start: 2017-04-14 | End: 2017-04-17 | Stop reason: HOSPADM

## 2017-04-14 RX ORDER — PROPOFOL 10 MG/ML
5 INJECTION, EMULSION INTRAVENOUS CONTINUOUS
Status: DISCONTINUED | OUTPATIENT
Start: 2017-04-14 | End: 2017-04-15

## 2017-04-14 RX ORDER — GLUCAGON 1 MG
1 KIT INJECTION
Status: DISCONTINUED | OUTPATIENT
Start: 2017-04-14 | End: 2017-04-17 | Stop reason: HOSPADM

## 2017-04-14 RX ORDER — ROCURONIUM BROMIDE 10 MG/ML
50 INJECTION, SOLUTION INTRAVENOUS ONCE
Status: COMPLETED | OUTPATIENT
Start: 2017-04-14 | End: 2017-04-14

## 2017-04-14 RX ORDER — IBUPROFEN 200 MG
16 TABLET ORAL
Status: DISCONTINUED | OUTPATIENT
Start: 2017-04-14 | End: 2017-04-17 | Stop reason: HOSPADM

## 2017-04-14 RX ORDER — FUROSEMIDE 10 MG/ML
60 INJECTION INTRAMUSCULAR; INTRAVENOUS ONCE
Status: COMPLETED | OUTPATIENT
Start: 2017-04-14 | End: 2017-04-14

## 2017-04-14 RX ORDER — CHLORHEXIDINE GLUCONATE ORAL RINSE 1.2 MG/ML
15 SOLUTION DENTAL 2 TIMES DAILY
Status: DISCONTINUED | OUTPATIENT
Start: 2017-04-14 | End: 2017-04-16

## 2017-04-14 RX ORDER — ACETAMINOPHEN 325 MG/1
650 TABLET ORAL EVERY 6 HOURS PRN
Status: DISCONTINUED | OUTPATIENT
Start: 2017-04-14 | End: 2017-04-17 | Stop reason: HOSPADM

## 2017-04-14 RX ORDER — FAMOTIDINE 10 MG/ML
20 INJECTION INTRAVENOUS DAILY
Status: DISCONTINUED | OUTPATIENT
Start: 2017-04-14 | End: 2017-04-17 | Stop reason: HOSPADM

## 2017-04-14 RX ORDER — FUROSEMIDE 10 MG/ML
40 INJECTION INTRAMUSCULAR; INTRAVENOUS ONCE
Status: COMPLETED | OUTPATIENT
Start: 2017-04-14 | End: 2017-04-14

## 2017-04-14 RX ORDER — LORAZEPAM 2 MG/ML
2 INJECTION INTRAMUSCULAR
Status: COMPLETED | OUTPATIENT
Start: 2017-04-14 | End: 2017-04-14

## 2017-04-14 RX ORDER — PHENTOLAMINE MESYLATE 5 MG/1
5 INJECTION INTRAMUSCULAR; INTRAVENOUS EVERY 4 HOURS PRN
Status: DISCONTINUED | OUTPATIENT
Start: 2017-04-14 | End: 2017-04-17 | Stop reason: HOSPADM

## 2017-04-14 RX ADMIN — PROPOFOL 5 MCG/KG/MIN: 10 INJECTION, EMULSION INTRAVENOUS at 03:04

## 2017-04-14 RX ADMIN — CHLORHEXIDINE GLUCONATE 15 ML: 1.2 RINSE ORAL at 09:04

## 2017-04-14 RX ADMIN — CHLORHEXIDINE GLUCONATE 15 ML: 1.2 RINSE ORAL at 08:04

## 2017-04-14 RX ADMIN — FAMOTIDINE 20 MG: 10 INJECTION, SOLUTION INTRAVENOUS at 09:04

## 2017-04-14 RX ADMIN — FENTANYL CITRATE 50 MCG/HR: 50 INJECTION, SOLUTION INTRAMUSCULAR; INTRAVENOUS at 12:04

## 2017-04-14 RX ADMIN — ROCURONIUM BROMIDE 50 MG: 10 INJECTION, SOLUTION INTRAVENOUS at 12:04

## 2017-04-14 RX ADMIN — MIDAZOLAM HYDROCHLORIDE 6 MG/HR: 5 INJECTION, SOLUTION INTRAMUSCULAR; INTRAVENOUS at 02:04

## 2017-04-14 RX ADMIN — FUROSEMIDE 60 MG: 10 INJECTION, SOLUTION INTRAMUSCULAR; INTRAVENOUS at 02:04

## 2017-04-14 RX ADMIN — PROPOFOL 50 MCG/KG/MIN: 10 INJECTION, EMULSION INTRAVENOUS at 06:04

## 2017-04-14 RX ADMIN — PROPOFOL 30 MCG/KG/MIN: 10 INJECTION, EMULSION INTRAVENOUS at 02:04

## 2017-04-14 RX ADMIN — CARVEDILOL 3.12 MG: 3.12 TABLET, FILM COATED ORAL at 08:04

## 2017-04-14 RX ADMIN — PROPOFOL 50 MCG/KG/MIN: 10 INJECTION, EMULSION INTRAVENOUS at 02:04

## 2017-04-14 RX ADMIN — CARVEDILOL 3.12 MG: 3.12 TABLET, FILM COATED ORAL at 01:04

## 2017-04-14 RX ADMIN — LORAZEPAM 2 MG: 2 INJECTION, SOLUTION INTRAMUSCULAR; INTRAVENOUS at 12:04

## 2017-04-14 RX ADMIN — FUROSEMIDE 40 MG: 10 INJECTION, SOLUTION INTRAMUSCULAR; INTRAVENOUS at 02:04

## 2017-04-14 RX ADMIN — PROPOFOL 50 MCG/KG/MIN: 10 INJECTION, EMULSION INTRAVENOUS at 09:04

## 2017-04-14 RX ADMIN — ENOXAPARIN SODIUM 40 MG: 100 INJECTION SUBCUTANEOUS at 05:04

## 2017-04-14 NOTE — PROGRESS NOTES
Results for EDWARD SURESH (MRN [id number]) as of 4/13/2017 23:07   Ref. Range 4/13/2017 21:31   POC PH Latest Ref Range: 7.35 - 7.45  7.092 (LL)   POC PCO2 Latest Ref Range: 35 - 45 mmHg 82.6 (HH)   POC PO2 Latest Ref Range: 80 - 100 mmHg 149 (H)   POC BE Latest Ref Range: -2 to 2 mmol/L -5   POC HCO3 Latest Ref Range: 24 - 28 mmol/L 25.1   POC SATURATED O2 Latest Ref Range: 95 - 100 % 98   POC TCO2 Latest Ref Range: 23 - 27 mmol/L 28 (H)   Sample Unknown ARTERIAL   DelSys Unknown CPAP/BiPAP   Allens Test Unknown Pass   Site Unknown RR     Results communicated to physician.

## 2017-04-14 NOTE — CONSULTS
"[facility] - Resident Note    Date of Admit: 2017    Reason for Consult     Respiratory failure    Subjective:      History of Present Illness:    50 yo male with no known PMH who was brought in by EMS  on Bipap due to acute hypoxic respiratory failure.    Per ED notes, EMS activated due to patient having shortness of breath.  On EMS arrival, O2 sat 70% on RA; started on Bipap en route and intubated in ED.  Initial ABG 7.09/82/149 on Bipap.    EtOH negative. Utox positive for THC and cocaine.  Initial CXR with diffuse bilateral airspace opacities.    Past Medical History:  No past medical history on file.    Past Surgical History:  No past surgical history on file.    Allergies:  Review of patient's allergies indicates:  No Known Allergies    Home Medications:  Prior to Admission medications    Not on File       Family History:  Family History   Problem Relation Age of Onset    Diabetes Mellitus Mother        Social History:  Social History   Substance Use Topics    Smoking status: Current Every Day Smoker     Packs/day: 1.00     Years: 32.00     Types: Cigarettes    Smokeless tobacco: Not on file    Alcohol use Not on file       Review of Systems:  Pertinent items are noted in HPI.  Unable to obtain further as patient intubated and sedated.    Objective:   Last 24 Hour Vital Signs:  BP  Min: 90/62  Max: 212/119  Temp  Av.9 °F (36.6 °C)  Min: 97.5 °F (36.4 °C)  Max: 98.2 °F (36.8 °C)  Pulse  Av.5  Min: 100  Max: 144  Resp  Av.3  Min: 16  Max: 47  SpO2  Av.1 %  Min: 88 %  Max: 100 %  Height  Av' 7.5" (171.5 cm)  Min: 5' 7" (170.2 cm)  Max: 5' 8" (172.7 cm)  Weight  Av.3 kg (177 lb 0.8 oz)  Min: 73.1 kg (161 lb 2.5 oz)  Max: 86.2 kg (190 lb)  Body mass index is 24.5 kg/(m^2).  I/O last 3 completed shifts:  In: 237.8 [I.V.:237.8]  Out: 1160 [Urine:1160]    Physical Examination:  Gen:  Intubated, sedated  Head: normocephalic, atraumatic  Eyes:  No conjunctivitis, no scleral " icterus  ENT:  MMM, ETT in place  CV:  Tachycardic, regular, no m/r/g  Resp:  Intubated, on mechanical ventilation; scattered crackles throughout bilateral lung fields; no wheeze  Abd: soft, nontender, nondistended, +BS  Ext: warm, well perfused, DP pulses +2 bilaterally; no peripheral edema  Skin: no jaundice, no visible rash  Neuro: sedated on propofol and versed  Psych:  Unable to assess    Laboratory:  Most Recent Data:  CBC: Lab Results   Component Value Date    WBC 12.59 04/13/2017    HGB 13.4 (L) 04/13/2017    HCT 40.8 04/13/2017     04/13/2017    MCV 91 04/13/2017    RDW 15.9 (H) 04/13/2017     BMP: Lab Results   Component Value Date     04/14/2017    K 5.3 (H) 04/14/2017     04/14/2017    CO2 26 04/14/2017    BUN 24 (H) 04/14/2017    CREATININE 1.5 (H) 04/14/2017    GLU 87 04/14/2017    CALCIUM 8.7 04/14/2017    MG 2.2 04/14/2017    PHOS 4.3 04/14/2017     LFTs: Lab Results   Component Value Date    PROT 7.4 04/14/2017    ALBUMIN 3.6 04/14/2017    BILITOT 0.4 04/14/2017     (H) 04/14/2017    ALKPHOS 127 04/14/2017     (H) 04/14/2017     Coags:   Lab Results   Component Value Date    INR 1.0 04/13/2017     FLP: No results found for: CHOL, HDL, LDLCALC, TRIG, CHOLHDL  DM: Lab Results   Component Value Date    CREATININE 1.5 (H) 04/14/2017     Thyroid: Lab Results   Component Value Date    TSH 2.339 04/13/2017     Anemia: No results found for: IRON, TIBC, FERRITIN, [id number], FOLATE  Cardiac: Lab Results   Component Value Date    TROPONINI 0.170 (H) 04/14/2017     (H) 04/13/2017     Urinalysis: No results found for: LABURIN, COLORU, CLARITYU, SPECGRAV, LABSPEC, NITRITE, PROTEINUR, GLUCOSEU, KETONESU, UROBILINOGEN, BILIRUBINUR, BLOODU, RBCU, WBCUA  Trended Cardiac Data:    Recent Labs  Lab 04/13/17  2146 04/14/17  0611   TROPONINI 0.099* 0.170*   *  --      Microbiology Data:  Bcx 4/13: pending    Other Results:  EKG (my interpretation): no EKG  available    Radiology:  Imaging Results         X-Ray Chest AP Portable (Final result) Result time:  04/14/17 08:52:30    Final result by Farhat Foster DO (04/14/17 08:52:30)    Impression:        1. As above      Electronically signed by: FARHAT FOSTER D.O.  Date:     04/14/17  Time:    08:52     Narrative:    Single view chest    Comparison:04/13/2017    Findings:    There are mixed interstitial/alveolar opacities within either lung right greater than the left.  The appearance is may be minimally improved when compared to the prior study.The heart is enlarged.  An endotracheal tube and nasogastric tube are in place..            X-Ray Chest AP Portable (Final result) Result time:  04/13/17 22:29:19    Final result by Audi Jaffe MD (04/13/17 22:29:19)    Impression:       Endotracheal tube and enteric tube tips in appropriate positions.    Diffuse bilateral airspace opacities.              Electronically signed by: AUDI JAFFE MD  Date:     04/13/17  Time:    22:29     Narrative:    Exam: [id number]  04/13/17  21:16:00 [id number] ([facility]) : XR CHEST AP PORTABLE    Technique:    Single frontal chest x-ray    Comparison:     None     Findings:      Monitoring EKG leads are present.  Enteric tube tip terminates below left hemidiaphragm.  There is an endotracheal tube with its tip in the plane of the mid intrathoracic trachea.    The cardiomediastinal silhouette is within normal limits.  The hemidiaphragms are not visualized.  No definite evidence of a pneumothorax is identified.  There are diffuse bilateral airspace opacities.  The osseous structures are within normal limits.             Assessment and Plans:     Neuro  -sedated on propofol and versed; recommend discontinuing versed, changing to fentanyl and wean as tolerated  -daily awakening trials    CV  -initial trop 0.099,   -no EKG, will obtain 12 lead  -possible heart failure component, 2D echo pending    Resp  -acute hypoxic + hypercapnic respiratory  failure; likely secondary to cocaine use, possible heart failure contribution  -continue low tidal volume ventilation, wean as tolerated    FEN/GI  -NPO, will need OG feeds if remains intubated >1-2 days  -monitor electrolytes and replace as needed    Renal  -Cr elevated at 1.7, no baseline in our records  -possible JUAN secondary to cocaine use; monitor CR and urine output    ID  -afebrile, normal WBC, no focal infiltrate on CXR  -monitor, obtain blood, urine and sputum cultures and start broad spectrum antibiotics if becomes febrile or WBC increases     Heme  -Hgb 13.4, continue to monitor    Code: full  Ppx: lovenox, famotidine  Dispo: continue MICU care     Meera Andersen  [facility]

## 2017-04-14 NOTE — ED NOTES
Pt extremely agitated. Dr. Lefort is in the room with patient.    100 mg Propofol given IV by Dr. Lefort.  100 mcg of Fentanyl given IV by Dr. Lefort.

## 2017-04-14 NOTE — SUBJECTIVE & OBJECTIVE
No past medical history on file.    No past surgical history on file.    Review of patient's allergies indicates:  No Known Allergies    No current facility-administered medications on file prior to encounter.      No current outpatient prescriptions on file prior to encounter.     Family History     Problem Relation (Age of Onset)    Diabetes Mellitus Mother        Social History Main Topics    Smoking status: Current Every Day Smoker     Packs/day: 1.00     Years: 32.00     Types: Cigarettes    Smokeless tobacco: Not on file    Alcohol use Not on file    Drug use: Not on file    Sexual activity: Not on file     Review of Systems   Unable to perform ROS: Mental status change     Objective:     Vital Signs (Most Recent):  Temp: 97.5 °F (36.4 °C) (04/13/17 2230)  Pulse: (!) 133 (04/14/17 0030)  Resp: (!) 30 (04/14/17 0030)  BP: (!) 172/89 (04/14/17 0030)  SpO2: 100 % (04/14/17 0030) Vital Signs (24h Range):  Temp:  [97.5 °F (36.4 °C)] 97.5 °F (36.4 °C)  Pulse:  [103-143] 133  Resp:  [16-33] 30  SpO2:  [96 %-100 %] 100 %  BP: (102-212)/() 172/89     Weight: 86.2 kg (190 lb)  There is no height or weight on file to calculate BMI.    Physical Exam   Constitutional: He appears well-developed and well-nourished. He is sedated and intubated.   HENT:   Head: Normocephalic and atraumatic.   Eyes: Right pupil is not reactive. Left pupil is not reactive.   Miosis   Neck: Neck supple. No tracheal deviation present.   Cardiovascular: Regular rhythm.  Tachycardia present.    Loud heart sounds   Pulmonary/Chest: Tachypnea noted. He is intubated.   Blood tinged sputum in endotracheal tube   Abdominal: Soft. There is no tenderness. There is no guarding.   Musculoskeletal: He exhibits no edema or tenderness.   Neurological: He displays no atrophy and no tremor.   Skin: Skin is warm and dry.   Psychiatric:   Cannot assess   Nursing note and vitals reviewed.       Significant Labs: All pertinent labs within the past 24 hours  have been reviewed. See HPI.    Significant Imaging: I have reviewed all pertinent imaging results/findings within the past 24 hours.   X-Ray Chest AP Portable 4/13/17: Monitoring EKG leads are present.  Enteric tube tip terminates below left hemidiaphragm.  There is an endotracheal tube with its tip in the plane of the mid intrathoracic trachea.  The cardiomediastinal silhouette is within normal limits.  The hemidiaphragms are not visualized.  No definite evidence of a pneumothorax is identified.  There are diffuse bilateral airspace opacities.  The osseous structures are within normal limits.

## 2017-04-14 NOTE — SUBJECTIVE & OBJECTIVE
No past medical history on file.    No past surgical history on file.    Review of patient's allergies indicates:  No Known Allergies    No current facility-administered medications on file prior to encounter.      No current outpatient prescriptions on file prior to encounter.     Family History     Problem Relation (Age of Onset)    Diabetes Mellitus Mother        Social History Main Topics    Smoking status: Current Every Day Smoker     Packs/day: 1.00     Years: 32.00     Types: Cigarettes    Smokeless tobacco: Not on file    Alcohol use Not on file    Drug use: Not on file    Sexual activity: Not on file     Review of Systems   Unable to perform ROS: intubated     Objective:     Vital Signs (Most Recent):  Temp: 98.2 °F (36.8 °C) (04/14/17 0736)  Pulse: 108 (04/14/17 1207)  Resp: (!) 34 (04/14/17 1207)  BP: 127/82 (04/14/17 1030)  SpO2: 97 % (04/14/17 1207) Vital Signs (24h Range):  Temp:  [97.5 °F (36.4 °C)-98.2 °F (36.8 °C)] 98.2 °F (36.8 °C)  Pulse:  [100-144] 108  Resp:  [16-47] 34  SpO2:  [88 %-100 %] 97 %  BP: ()/() 127/82     Weight: 73.1 kg (161 lb 2.5 oz)  Body mass index is 24.5 kg/(m^2).    SpO2: 97 %  O2 Device (Oxygen Therapy): ventilator      Intake/Output Summary (Last 24 hours) at 04/14/17 1237  Last data filed at 04/14/17 0710   Gross per 24 hour   Intake           237.77 ml   Output             1385 ml   Net         -1147.23 ml       Lines/Drains/Airways     Drain                 Urethral Catheter 04/13/17 2121 Latex 14 Fr. less than 1 day          Airway                 Airway - Non-Surgical 04/13/17 2059 less than 1 day          Peripheral Intravenous Line                 Peripheral IV - Single Lumen 04/14/17 0128 Right Forearm less than 1 day                Physical Exam   Constitutional: He appears well-developed and well-nourished. He is intubated.   HENT:   Head: Normocephalic and atraumatic.   Neck: Normal carotid pulses and no JVD present. Carotid bruit is not present.  No tracheal deviation present.   Cardiovascular: Regular rhythm, S1 normal, S2 normal, normal heart sounds and intact distal pulses.  Tachycardia present.  PMI is not displaced.  Exam reveals no gallop, no S3, no S4, no distant heart sounds, no friction rub, no midsystolic click and no opening snap.    No murmur heard.  Pulses:       Carotid pulses are 2+ on the right side, and 2+ on the left side.       Radial pulses are 2+ on the right side, and 2+ on the left side.        Dorsalis pedis pulses are 2+ on the right side, and 2+ on the left side.        Posterior tibial pulses are 2+ on the right side, and 2+ on the left side.   Pulmonary/Chest: Effort normal and breath sounds normal. No accessory muscle usage. No apnea, no tachypnea and no bradypnea. He is intubated. No respiratory distress. He has no decreased breath sounds. He has no wheezes. He has no rhonchi. He has no rales. He exhibits no tenderness.   Abdominal: Soft. Normal aorta and bowel sounds are normal. He exhibits no distension, no abdominal bruit, no pulsatile midline mass and no mass. There is no tenderness.   Musculoskeletal: He exhibits no edema.   Neurological: He is unresponsive.   Skin: Skin is warm. No rash noted. No erythema. No pallor.   Psychiatric: He has a normal mood and affect. His behavior is normal. Judgment and thought content normal.   Vitals reviewed.      Significant Labs:   ABG:   Recent Labs  Lab 04/13/17 2131 04/13/17  2340 04/14/17  0944   PH 7.092* 7.126* 7.363   PCO2 82.6* 82.9* 48.1*   HCO3 25.1 27.3 27.4   POCSATURATED 98 98 95   BE -5 -2 2   , BMP:   Recent Labs  Lab 04/13/17  2146 04/14/17  0611   * 87    140   K 4.2 5.3*    105   CO2 19* 26   BUN 21* 24*   CREATININE 1.7* 1.5*   CALCIUM 8.6* 8.7   MG 2.0 2.2   , CMP   Recent Labs  Lab 04/13/17  2146 04/14/17  0611    140   K 4.2 5.3*    105   CO2 19* 26   * 87   BUN 21* 24*   CREATININE 1.7* 1.5*   CALCIUM 8.6* 8.7   PROT 7.9 7.4    ALBUMIN 3.7 3.6   BILITOT 0.4 0.4   ALKPHOS 146* 127   * 114*   * 106*   ANIONGAP 14 9   ESTGFRAFRICA 52* >60   EGFRNONAA 45* 53*   , CBC   Recent Labs  Lab 04/13/17 2146   WBC 12.59   HGB 13.4*   HCT 40.8      , INR   Recent Labs  Lab 04/13/17 2146   INR 1.0   , Lipid Panel No results for input(s): CHOL, HDL, LDLCALC, TRIG, CHOLHDL in the last 48 hours. and Troponin   Recent Labs  Lab 04/13/17  2146 04/14/17  0611 04/14/17  1144   TROPONINI 0.099* 0.170* 0.253*       Significant Imaging: CT scan: CT ABDOMEN PELVIS WITH CONTRAST: No results found for this visit on 04/13/17., Echocardiogram:   2D echo with color flow doppler:   Results for orders placed or performed during the hospital encounter of 04/13/17   2D echo with color flow doppler   Result Value Ref Range    EF 15 (A) 55 - 65    Mitral Valve Regurgitation MODERATE (A)     Est. PA Systolic Pressure 30.09     Pericardial Effusion NONE     Tricuspid Valve Regurgitation TRIVIAL     and X-Ray: CXR: X-Ray Chest 1 View (CXR): No results found for this visit on 04/13/17.

## 2017-04-14 NOTE — PROGRESS NOTES
Remains on the ventilator and sedated today. Consulted Pulmonary and Cardiology. Troponin continued to trend up. Echo showed LVEF of 15 to 20%. Diuresis today with lasix. Continue to monitor closely.     Russel Blanchard MD, [facility]  Staff Hospitalist  Pager: [phone]  Spectralink: [phone]

## 2017-04-14 NOTE — ED NOTES
Patient identifiers for Oral Usby checked and correct.  LOC: Awake, on bipap.  APPEARANCE: Diaphoretic. Very agitated, in distress.    SKIN: The skin is warm and dry, patient has normal skin turgor and moist mucus membranes, skin intact, no breakdown or brusing noted.  MUSKULOSKELETAL: Patient moving all extremities well, no obvious swelling or deformities noted.  RESPIRATORY: Labored. Lungs auscultated coarse.  CARDIAC: Patient tachycardic, no periphreal edema noted.  ABDOMEN: Soft and non tender to palpation, no distention noted.

## 2017-04-14 NOTE — PLAN OF CARE
Pt arrived to ICU at 0120 via stretcher with RN. Pt on versed at 7 emg/hr and propofol at 30 mcg/kg/min and restrains. Pt response to repeated stimulation. Two peripheral IVs infiltrated. Removed 2 IVs and start two new IV. NG tube to low continuous suction. Safety maintain.

## 2017-04-14 NOTE — PLAN OF CARE
Pt intubated and sedate with versed and propofol. Decrease versed to 2 mg/hr currently. Urine output is good. Heart rate and BP much improved. Oral care provided. Safety maintain.

## 2017-04-14 NOTE — CONSULTS
[facility]  Cardiology  Consult Note    Patient Name: Oral Harrington  MRN: [id number]  Admission Date: 4/13/2017  Hospital Length of Stay: 0 days  Code Status: Full Code   Attending Provider: Russel Blanchard MD   Consulting Provider: Robin Chandra MD  Primary Care Physician: No primary care provider on file.  Principal Problem:Cocaine intoxication with complication    Patient information was obtained from ER records.     Consults  Subjective:     Chief Complaint:  dyspnea     HPI:   51 year-old male admitted through ED with acute respiratory failure.  O2 sats 70%.  Intubated, admitted to ICU.  Signs of CHF on CXR.  Known tobacco smoker.  U tox also + for coke and weed.    No past medical history on file.    No past surgical history on file.    Review of patient's allergies indicates:  No Known Allergies    No current facility-administered medications on file prior to encounter.      No current outpatient prescriptions on file prior to encounter.     Family History     Problem Relation (Age of Onset)    Diabetes Mellitus Mother        Social History Main Topics    Smoking status: Current Every Day Smoker     Packs/day: 1.00     Years: 32.00     Types: Cigarettes    Smokeless tobacco: Not on file    Alcohol use Not on file    Drug use: Not on file    Sexual activity: Not on file     Review of Systems   Unable to perform ROS: intubated     Objective:     Vital Signs (Most Recent):  Temp: 98.2 °F (36.8 °C) (04/14/17 0736)  Pulse: 108 (04/14/17 1207)  Resp: (!) 34 (04/14/17 1207)  BP: 127/82 (04/14/17 1030)  SpO2: 97 % (04/14/17 1207) Vital Signs (24h Range):  Temp:  [97.5 °F (36.4 °C)-98.2 °F (36.8 °C)] 98.2 °F (36.8 °C)  Pulse:  [100-144] 108  Resp:  [16-47] 34  SpO2:  [88 %-100 %] 97 %  BP: ()/() 127/82     Weight: 73.1 kg (161 lb 2.5 oz)  Body mass index is 24.5 kg/(m^2).    SpO2: 97 %  O2 Device (Oxygen Therapy): ventilator      Intake/Output Summary (Last 24 hours) at  04/14/17 1237  Last data filed at 04/14/17 0710   Gross per 24 hour   Intake           237.77 ml   Output             1385 ml   Net         -1147.23 ml       Lines/Drains/Airways     Drain                 Urethral Catheter 04/13/17 2121 Latex 14 Fr. less than 1 day          Airway                 Airway - Non-Surgical 04/13/17 2059 less than 1 day          Peripheral Intravenous Line                 Peripheral IV - Single Lumen 04/14/17 0128 Right Forearm less than 1 day                Physical Exam   Constitutional: He appears well-developed and well-nourished. He is intubated.   HENT:   Head: Normocephalic and atraumatic.   Neck: Normal carotid pulses and no JVD present. Carotid bruit is not present. No tracheal deviation present.   Cardiovascular: Regular rhythm, S1 normal, S2 normal, normal heart sounds and intact distal pulses.  Tachycardia present.  PMI is not displaced.  Exam reveals no gallop, no S3, no S4, no distant heart sounds, no friction rub, no midsystolic click and no opening snap.    No murmur heard.  Pulses:       Carotid pulses are 2+ on the right side, and 2+ on the left side.       Radial pulses are 2+ on the right side, and 2+ on the left side.        Dorsalis pedis pulses are 2+ on the right side, and 2+ on the left side.        Posterior tibial pulses are 2+ on the right side, and 2+ on the left side.   Pulmonary/Chest: Effort normal and breath sounds normal. No accessory muscle usage. No apnea, no tachypnea and no bradypnea. He is intubated. No respiratory distress. He has no decreased breath sounds. He has no wheezes. He has no rhonchi. He has no rales. He exhibits no tenderness.   Abdominal: Soft. Normal aorta and bowel sounds are normal. He exhibits no distension, no abdominal bruit, no pulsatile midline mass and no mass. There is no tenderness.   Musculoskeletal: He exhibits no edema.   Neurological: He is unresponsive.   Skin: Skin is warm. No rash noted. No erythema. No pallor.    Psychiatric: He has a normal mood and affect. His behavior is normal. Judgment and thought content normal.   Vitals reviewed.      Significant Labs:   ABG:   Recent Labs  Lab 04/13/17  2131 04/13/17  2340 04/14/17  0944   PH 7.092* 7.126* 7.363   PCO2 82.6* 82.9* 48.1*   HCO3 25.1 27.3 27.4   POCSATURATED 98 98 95   BE -5 -2 2   , BMP:   Recent Labs  Lab 04/13/17 2146 04/14/17  0611   * 87    140   K 4.2 5.3*    105   CO2 19* 26   BUN 21* 24*   CREATININE 1.7* 1.5*   CALCIUM 8.6* 8.7   MG 2.0 2.2   , CMP   Recent Labs  Lab 04/13/17 2146 04/14/17  0611    140   K 4.2 5.3*    105   CO2 19* 26   * 87   BUN 21* 24*   CREATININE 1.7* 1.5*   CALCIUM 8.6* 8.7   PROT 7.9 7.4   ALBUMIN 3.7 3.6   BILITOT 0.4 0.4   ALKPHOS 146* 127   * 114*   * 106*   ANIONGAP 14 9   ESTGFRAFRICA 52* >60   EGFRNONAA 45* 53*   , CBC   Recent Labs  Lab 04/13/17 2146   WBC 12.59   HGB 13.4*   HCT 40.8      , INR   Recent Labs  Lab 04/13/17 2146   INR 1.0   , Lipid Panel No results for input(s): CHOL, HDL, LDLCALC, TRIG, CHOLHDL in the last 48 hours. and Troponin   Recent Labs  Lab 04/13/17 2146 04/14/17  0611 04/14/17  1144   TROPONINI 0.099* 0.170* 0.253*       Significant Imaging: CT scan: CT ABDOMEN PELVIS WITH CONTRAST: No results found for this visit on 04/13/17., Echocardiogram:   2D echo with color flow doppler:   Results for orders placed or performed during the hospital encounter of 04/13/17   2D echo with color flow doppler   Result Value Ref Range    EF 15 (A) 55 - 65    Mitral Valve Regurgitation MODERATE (A)     Est. PA Systolic Pressure 30.09     Pericardial Effusion NONE     Tricuspid Valve Regurgitation TRIVIAL     and X-Ray: CXR: X-Ray Chest 1 View (CXR): No results found for this visit on 04/13/17.    Assessment and Plan:     Acute hypercapnic respiratory failure  Intubated.  Managed by critical care team.    Cocaine abuse  Cocaine cessation    Acute congestive  heart failure  Respiratory failure with likely component of CHF.  EF 20% on TTE - likely due to cocaine.  Does not appear to be in cardiogenic shock.    -Lasix 40mg IV today  -Coreg 3.125mg bid  -ACEi if Cr improves  -Will require cardiac cath once extubated, stabilized, CR improving.  -Counsell  Smoking cessation and cessation of polysubstance abuse once awake.    Cigarette smoker  Smoking cessation      VTE Risk Mitigation         Ordered     enoxaparin injection 40 mg  Daily     Route:  Subcutaneous        04/14/17 0140     Medium Risk of VTE  Once      04/14/17 0140          Thank you for your consult. I will follow-up with patient. Please contact us if you have any additional questions.    Robin Chandra MD  Cardiology   [facility]

## 2017-04-14 NOTE — ASSESSMENT & PLAN NOTE
Cocaine abuse  Continue midazolam. Phentolamine 5 mg IV q4h PRN for tachycardia and hypertension. Avoid beta-blockers.

## 2017-04-14 NOTE — H&P
[facility] Hospital Medicine  History & Physical    Patient Name: Oral Harrington  MRN: [id number]  Admission Date: 4/13/2017  Attending Physician: Russel Blanchard MD  Primary Care Provider: No primary care provider on file.         Patient information was obtained from ER records.     Subjective:     Principal Problem:Cocaine intoxication with complication    Chief Complaint:   Chief Complaint   Patient presents with    Shortness of Breath     arrived to ED via EMS on BiPAP. EMS reports sat were 70 prior to BiPAP, with BiPAP sat up to 90        HPI: Oral Harrington is a 51 y.o. black man with cigarette smoking and cocaine abuse. He lives in [place], [place].     He was taken to [facility] ED on 4/13/17 in respiratory distress with tachycardia and hypertension. He required BiPAP in the ambulance. Upon arrival, he was intubated and put on propofol. Chest x-ray showed diffuse bilateral airspace opacities. Labs showed respiratory acidosis with hypercapnia (pH 7.092, pCO2 82.6, bicarbonate 19), renal dysfunction (BUN 21, creatinine 1.7), elevated transaminases (, ), elevated troponin (0.099) and BNP (298). Urine toxicology was positive for cocaine. EKG showed no ST elevations. He was put on midazolam drip to treat cocaine intoxication. He was admitted to [facility].       No past medical history on file.    No past surgical history on file.    Review of patient's allergies indicates:  No Known Allergies    No current facility-administered medications on file prior to encounter.      No current outpatient prescriptions on file prior to encounter.     Family History     Problem Relation (Age of Onset)    Diabetes Mellitus Mother        Social History Main Topics    Smoking status: Current Every Day Smoker     Packs/day: 1.00     Years: 32.00     Types: Cigarettes    Smokeless tobacco: Not on file    Alcohol use Not on file    Drug use: Not on  file    Sexual activity: Not on file     Review of Systems   Unable to perform ROS: Mental status change     Objective:     Vital Signs (Most Recent):  Temp: 97.5 °F (36.4 °C) (04/13/17 2230)  Pulse: (!) 133 (04/14/17 0030)  Resp: (!) 30 (04/14/17 0030)  BP: (!) 172/89 (04/14/17 0030)  SpO2: 100 % (04/14/17 0030) Vital Signs (24h Range):  Temp:  [97.5 °F (36.4 °C)] 97.5 °F (36.4 °C)  Pulse:  [103-143] 133  Resp:  [16-33] 30  SpO2:  [96 %-100 %] 100 %  BP: (102-212)/() 172/89     Weight: 86.2 kg (190 lb)  There is no height or weight on file to calculate BMI.    Physical Exam   Constitutional: He appears well-developed and well-nourished. He is sedated and intubated.   HENT:   Head: Normocephalic and atraumatic.   Eyes: Right pupil is not reactive. Left pupil is not reactive.   Miosis   Neck: Neck supple. No tracheal deviation present.   Cardiovascular: Regular rhythm.  Tachycardia present.    Loud heart sounds   Pulmonary/Chest: Tachypnea noted. He is intubated.   Blood tinged sputum in endotracheal tube   Abdominal: Soft. There is no tenderness. There is no guarding.   Musculoskeletal: He exhibits no edema or tenderness.   Neurological: He displays no atrophy and no tremor.   Skin: Skin is warm and dry.   Psychiatric:   Cannot assess   Nursing note and vitals reviewed.       Significant Labs: All pertinent labs within the past 24 hours have been reviewed. See HPI.    Significant Imaging: I have reviewed all pertinent imaging results/findings within the past 24 hours.   X-Ray Chest AP Portable 4/13/17: Monitoring EKG leads are present.  Enteric tube tip terminates below left hemidiaphragm.  There is an endotracheal tube with its tip in the plane of the mid intrathoracic trachea.  The cardiomediastinal silhouette is within normal limits.  The hemidiaphragms are not visualized.  No definite evidence of a pneumothorax is identified.  There are diffuse bilateral airspace opacities.  The osseous structures are  within normal limits.    Assessment/Plan:     * Cocaine intoxication with complication  Cocaine abuse  Continue midazolam. Phentolamine 5 mg IV q4h PRN for tachycardia and hypertension. Avoid beta-blockers.       Acute hypercapnic respiratory failure  Intubated. Wean ventilator when pulmonary edema improves.      Acute congestive heart failure  Give furosemide 60 mg IV x 1 and monitor response. Echocardiogram.       Acute kidney injury  Monitor.      Elevated transaminase level  Monitor.      VTE Risk Mitigation     None        Aleks Macias MD  Department of Hospital Medicine   [facility]

## 2017-04-14 NOTE — ASSESSMENT & PLAN NOTE
Respiratory failure with likely component of CHF.  EF 20% on TTE - likely due to cocaine.  Does not appear to be in cardiogenic shock.    -Lasix 40mg IV today  -Coreg 3.125mg bid  -ACEi if Cr improves  -Will require cardiac cath once extubated, stabilized, CR improving.  -Counsell  Smoking cessation and cessation of polysubstance abuse once awake.

## 2017-04-14 NOTE — ED PROVIDER NOTES
Encounter Date: 4/13/2017       History     Chief Complaint   Patient presents with    Shortness of Breath     arrived to ED via EMS on BiPAP. EMS reports sat were 70 prior to BiPAP, with BiPAP sat up to 90     Review of patient's allergies indicates:  Not on File  Patient is a 51 y.o. male presenting with the following complaint: shortness of breath. The history is provided by the patient, the EMS personnel and a relative.   Shortness of Breath   This is a new problem. The average episode lasts 15 minutes. The problem occurs continuously.The current episode started less than 1 hour ago. The problem has been gradually worsening. Associated symptoms include sputum production. Pertinent negatives include no fever and no chest pain. It is unknown what precipitated the problem. He has tried nothing for the symptoms. He has had no prior ED visits. Associated medical issues do not include asthma, COPD, chronic lung disease or heart failure.     No past medical history on file.  No past surgical history on file.  No family history on file.  Social History   Substance Use Topics    Smoking status: Not on file    Smokeless tobacco: Not on file    Alcohol use Not on file     Review of Systems   Unable to perform ROS: Severe respiratory distress   Constitutional: Negative for fever.   Respiratory: Positive for sputum production and shortness of breath.    Cardiovascular: Negative for chest pain.       Physical Exam   Initial Vitals   BP Pulse Resp Temp SpO2   -- -- -- -- --            Physical Exam    Nursing note and vitals reviewed.  Constitutional: He appears well-developed. He is diaphoretic. He appears distressed. Face mask in place.   Eyes: Conjunctivae and EOM are normal. No scleral icterus.   Neck: No tracheal deviation present. JVD present.   Cardiovascular: Regular rhythm and intact distal pulses. Tachycardia present.  Exam reveals no gallop and no friction rub.    No murmur heard.  Pulmonary/Chest: Accessory  muscle usage present. Tachypnea noted. He is in respiratory distress. He has rales.   Abdominal: Soft. There is no tenderness.   Neurological: He is alert. He has normal strength. GCS eye subscore is 4. GCS verbal subscore is 5. GCS motor subscore is 6.         ED Course   Intubation  Date/Time: 4/13/2017 9:47 PM  Location procedure was performed: [facility] EMERGENCY DEPARTMENT  Performed by: LEFORT, GUY J.  Authorized by: LEFORT, GUY J.   Consent Done: Emergent Situation  Indications: respiratory failure and  hypoxemia  Intubation method: direct  Patient status: paralyzed (RSI)  Preoxygenation: BVM  Sedatives: etomidate  Paralytic: succinylcholine  Laryngoscope size: Arauz 3  Tube size: 8.0 mm  Tube type: cuffed  Number of attempts: 1  Cricoid pressure: yes  Cords visualized: yes  Post-procedure assessment: chest rise and ETCO2 monitor  Breath sounds: rales/crackles and equal  Cuff inflated: yes  ETT to teeth: 24 cm  Tube secured with: ETT moore  Chest x-ray interpreted by me.  Chest x-ray findings: endotracheal tube in appropriate position  Patient tolerance: Patient tolerated the procedure well with no immediate complications  Complications: No    Critical Care  Date/Time: 4/13/2017 10:00 PM  Performed by: LEFORT, GUY J.  Authorized by: LEFORT, GUY J.   Direct patient critical care time: 45 minutes  Additional history critical care time: 5 minutes  Ordering / reviewing critical care time: 10 minutes  Documentation critical care time: 10 minutes  Consulting other physicians critical care time: 5 minutes  Consult with family critical care time: 5 minutes  Total critical care time (exclusive of procedural time) : 80 minutes  Critical care time was exclusive of separately billable procedures and treating other patients.  Critical care was necessary to treat or prevent imminent or life-threatening deterioration of the following conditions: respiratory failure.  Critical care was time spent personally by me on the  following activities: gastric intubation, pulse oximetry, re-evaluation of patient's condition, evaluation of patient's response to treatment, discussions with consultants, ordering and review of radiographic studies, obtaining history from patient or surrogate and examination of patient.        Labs Reviewed   ISTAT PROCEDURE - Abnormal; Notable for the following:        Result Value    POC PH 7.092 (*)     POC PCO2 82.6 (*)     POC PO2 149 (*)     POC TCO2 28 (*)     All other components within normal limits   CULTURE, BLOOD   CULTURE, BLOOD   DRUG SCREEN PANEL, URINE EMERGENCY   ALCOHOL,MEDICAL (ETHANOL)   TSH   MAGNESIUM   PHOSPHORUS   TYPE & SCREEN     EKG Readings: (Independently Interpreted)   Initial Reading: No STEMI. Rhythm: Sinus Tachycardia. Ectopy: No Ectopy. T Waves Elevated: V3 and V4. Axis: Normal.       X-Rays:   Independently Interpreted Readings:   Chest X-Ray: Increased vascular markings consistent with CHF are present.     Medical Decision Making:   Initial Assessment:   Failed trial of CPAP as decrease in mentation and RR signs of impending failure.  Differential Diagnosis:   CHF, MI, PE, PTX, PNA, thyroid, ARF, lytes, sepsis, valvular  ED Management:  See intubation notes.  Difficulty maintaining normal HR and BP, BZD + fentanyl seemed to be most effective.  Concern for cocaine toxicity.  Discussion with family regarding critical prognosis and need for ICU placement.                     ED Course     Clinical Impression:   The primary encounter diagnosis was Acute pulmonary edema. Diagnoses of Critical airway, Acute congestive heart failure, unspecified congestive heart failure type, Acute respiratory failure with hypoxia and hypercapnia, and Cocaine intoxication with complication were also pertinent to this visit.    Disposition:   Disposition: Admitted  Condition: Critical       Guy J. Lefort, MD  04/14/17 6692

## 2017-04-14 NOTE — PLAN OF CARE
Problem: Fall Risk (Adult)  Goal: Identify Related Risk Factors and Signs and Symptoms  Related risk factors and signs and symptoms are identified upon initiation of Human Response Clinical Practice Guideline (CPG)   Outcome: Ongoing (interventions implemented as appropriate)  Continue safety precaution measures and monitor patient and titrate medications as needed to continue sedation    Problem: Restraint, Nonbehavioral (nonviolent)  Goal: Clinical Justification  Outcome: Ongoing (interventions implemented as appropriate)  Continue restraints and to monitor for any injury or change

## 2017-04-14 NOTE — ED NOTES
CONTACT NUMBERS:  Wife Nadya Harrington [phone] cell, work [phone] Ext. [phone], cell phone [phone].  Sister Keyonna [phone] cell, work [phone] (work).

## 2017-04-14 NOTE — ED NOTES
Dr. Lefort, Clara Wong, ANJELICA, and Anyi Delgado RN in room.      20 mg Amidate given IV by doctor.  100 mg Succinylcholine given IV by doctor.

## 2017-04-15 PROBLEM — I50.9 ACUTE CONGESTIVE HEART FAILURE: Status: ACTIVE | Noted: 2017-04-15

## 2017-04-15 PROBLEM — J81.0 ACUTE PULMONARY EDEMA: Status: ACTIVE | Noted: 2017-04-15

## 2017-04-15 LAB
ALBUMIN SERPL BCP-MCNC: 3.4 G/DL
ALLENS TEST: ABNORMAL
ALP SERPL-CCNC: 113 U/L
ALT SERPL W/O P-5'-P-CCNC: 71 U/L
ANION GAP SERPL CALC-SCNC: 9 MMOL/L
ANISOCYTOSIS BLD QL SMEAR: SLIGHT
AST SERPL-CCNC: 39 U/L
BASOPHILS # BLD AUTO: 0.02 K/UL
BASOPHILS NFR BLD: 0.3 %
BILIRUB DIRECT SERPL-MCNC: 0.2 MG/DL
BILIRUB SERPL-MCNC: 0.6 MG/DL
BUN SERPL-MCNC: 28 MG/DL
CALCIUM SERPL-MCNC: 9 MG/DL
CHLORIDE SERPL-SCNC: 105 MMOL/L
CO2 SERPL-SCNC: 24 MMOL/L
CREAT SERPL-MCNC: 1.6 MG/DL
DELSYS: ABNORMAL
DIFFERENTIAL METHOD: ABNORMAL
EOSINOPHIL # BLD AUTO: 0.1 K/UL
EOSINOPHIL NFR BLD: 0.9 %
ERYTHROCYTE [DISTWIDTH] IN BLOOD BY AUTOMATED COUNT: 16.1 %
ERYTHROCYTE [SEDIMENTATION RATE] IN BLOOD BY WESTERGREN METHOD: 24 MM/H
EST. GFR  (AFRICAN AMERICAN): 56 ML/MIN/1.73 M^2
EST. GFR  (NON AFRICAN AMERICAN): 49 ML/MIN/1.73 M^2
FIO2: 40
GLUCOSE SERPL-MCNC: 99 MG/DL
HCO3 UR-SCNC: 27.1 MMOL/L (ref 24–28)
HCT VFR BLD AUTO: 38.6 %
HGB BLD-MCNC: 13 G/DL
LYMPHOCYTES # BLD AUTO: 1.2 K/UL
LYMPHOCYTES NFR BLD: 20.3 %
MAGNESIUM SERPL-MCNC: 2.1 MG/DL
MCH RBC QN AUTO: 30.3 PG
MCHC RBC AUTO-ENTMCNC: 33.7 %
MCV RBC AUTO: 90 FL
MODE: ABNORMAL
MONOCYTES # BLD AUTO: 1 K/UL
MONOCYTES NFR BLD: 16.6 %
NEUTROPHILS # BLD AUTO: 3.6 K/UL
NEUTROPHILS NFR BLD: 61.9 %
PCO2 BLDA: 44.6 MMHG (ref 35–45)
PEEP: 5
PH SMN: 7.39 [PH] (ref 7.35–7.45)
PHOSPHATE SERPL-MCNC: 3.5 MG/DL
PLATELET # BLD AUTO: 203 K/UL
PLATELET BLD QL SMEAR: ABNORMAL
PMV BLD AUTO: 9.2 FL
PO2 BLDA: 73 MMHG (ref 80–100)
POC BE: 2 MMOL/L
POC SATURATED O2: 94 % (ref 95–100)
POC TCO2: 28 MMOL/L (ref 23–27)
POTASSIUM SERPL-SCNC: 4.3 MMOL/L
PROT SERPL-MCNC: 7.2 G/DL
RBC # BLD AUTO: 4.29 M/UL
SAMPLE: ABNORMAL
SITE: ABNORMAL
SODIUM SERPL-SCNC: 138 MMOL/L
TROPONIN I SERPL DL<=0.01 NG/ML-MCNC: 0.25 NG/ML
VT: 400
WBC # BLD AUTO: 5.85 K/UL

## 2017-04-15 PROCEDURE — 80048 BASIC METABOLIC PNL TOTAL CA: CPT

## 2017-04-15 PROCEDURE — 99900035 HC TECH TIME PER 15 MIN (STAT)

## 2017-04-15 PROCEDURE — 94003 VENT MGMT INPAT SUBQ DAY: CPT

## 2017-04-15 PROCEDURE — 80076 HEPATIC FUNCTION PANEL: CPT

## 2017-04-15 PROCEDURE — 94761 N-INVAS EAR/PLS OXIMETRY MLT: CPT

## 2017-04-15 PROCEDURE — 85025 COMPLETE CBC W/AUTO DIFF WBC: CPT

## 2017-04-15 PROCEDURE — 63600175 PHARM REV CODE 636 W HCPCS: Performed by: HOSPITALIST

## 2017-04-15 PROCEDURE — 63600175 PHARM REV CODE 636 W HCPCS: Performed by: INTERNAL MEDICINE

## 2017-04-15 PROCEDURE — 25000003 PHARM REV CODE 250: Performed by: HOSPITALIST

## 2017-04-15 PROCEDURE — 36415 COLL VENOUS BLD VENIPUNCTURE: CPT

## 2017-04-15 PROCEDURE — 27000190 HC CPAP FULL FACE MASK W/VALVE

## 2017-04-15 PROCEDURE — 27000221 HC OXYGEN, UP TO 24 HOURS

## 2017-04-15 PROCEDURE — 84100 ASSAY OF PHOSPHORUS: CPT

## 2017-04-15 PROCEDURE — 20000000 HC ICU ROOM

## 2017-04-15 PROCEDURE — 94660 CPAP INITIATION&MGMT: CPT

## 2017-04-15 PROCEDURE — 25000003 PHARM REV CODE 250: Performed by: INTERNAL MEDICINE

## 2017-04-15 PROCEDURE — 82803 BLOOD GASES ANY COMBINATION: CPT

## 2017-04-15 PROCEDURE — 83735 ASSAY OF MAGNESIUM: CPT

## 2017-04-15 PROCEDURE — 84484 ASSAY OF TROPONIN QUANT: CPT

## 2017-04-15 PROCEDURE — 36600 WITHDRAWAL OF ARTERIAL BLOOD: CPT

## 2017-04-15 PROCEDURE — 99233 SBSQ HOSP IP/OBS HIGH 50: CPT | Mod: ,,, | Performed by: INTERNAL MEDICINE

## 2017-04-15 RX ORDER — DEXMEDETOMIDINE HYDROCHLORIDE 4 UG/ML
0.2 INJECTION, SOLUTION INTRAVENOUS CONTINUOUS
Status: DISCONTINUED | OUTPATIENT
Start: 2017-04-15 | End: 2017-04-16

## 2017-04-15 RX ORDER — FUROSEMIDE 10 MG/ML
40 INJECTION INTRAMUSCULAR; INTRAVENOUS ONCE
Status: COMPLETED | OUTPATIENT
Start: 2017-04-15 | End: 2017-04-15

## 2017-04-15 RX ADMIN — CARVEDILOL 3.12 MG: 3.12 TABLET, FILM COATED ORAL at 08:04

## 2017-04-15 RX ADMIN — PROPOFOL 20 MCG/KG/MIN: 10 INJECTION, EMULSION INTRAVENOUS at 05:04

## 2017-04-15 RX ADMIN — DEXMEDETOMIDINE HYDROCHLORIDE 0.4 MCG/KG/HR: 4 INJECTION, SOLUTION INTRAVENOUS at 11:04

## 2017-04-15 RX ADMIN — FUROSEMIDE 40 MG: 10 INJECTION, SOLUTION INTRAMUSCULAR; INTRAVENOUS at 12:04

## 2017-04-15 RX ADMIN — PROPOFOL 20 MCG/KG/MIN: 10 INJECTION, EMULSION INTRAVENOUS at 12:04

## 2017-04-15 RX ADMIN — ENOXAPARIN SODIUM 40 MG: 100 INJECTION SUBCUTANEOUS at 05:04

## 2017-04-15 RX ADMIN — CHLORHEXIDINE GLUCONATE 15 ML: 1.2 RINSE ORAL at 08:04

## 2017-04-15 RX ADMIN — FAMOTIDINE 20 MG: 10 INJECTION, SOLUTION INTRAVENOUS at 08:04

## 2017-04-15 NOTE — PROGRESS NOTES
[facility]  Cardiology  Progress Note    Patient Name: Oral Harrington  MRN: [id number]  Admission Date: 4/13/2017  Hospital Length of Stay: 1 days  Code Status: Full Code   Attending Physician: Russel Blanchard MD   Primary Care Physician: No primary care provider on file.  Expected Discharge Date:   Principal Problem:Cocaine intoxication with complication    Subjective:     Hospital Course:   Admitted to ICU.  On propofol for sedation.  TTE  EF 20%.  Remains intubated.  Diuresed nearly 3 liters past 24 hours with IV lasix 40mg x 1.  Cr stable at 1.6.  Coreg initiated with good BP response.    Interval History: no new events    Review of Systems   Unable to perform ROS: intubated     Objective:     Vital Signs (Most Recent):  Temp: 97.4 °F (36.3 °C) (04/15/17 0705)  Pulse: (!) 114 (04/15/17 0932)  Resp: 18 (04/15/17 0932)  BP: (!) 171/95 (04/15/17 0932)  SpO2: (!) 93 % (04/15/17 0932) Vital Signs (24h Range):  Temp:  [97.4 °F (36.3 °C)-99.3 °F (37.4 °C)] 97.4 °F (36.3 °C)  Pulse:  [] 114  Resp:  [9-34] 18  SpO2:  [91 %-100 %] 93 %  BP: ()/() 171/95     Weight: 74.3 kg (163 lb 12.8 oz)  Body mass index is 24.91 kg/(m^2).     SpO2: (!) 93 %  O2 Device (Oxygen Therapy): ventilator      Intake/Output Summary (Last 24 hours) at 04/15/17 1019  Last data filed at 04/15/17 0900   Gross per 24 hour   Intake            577.9 ml   Output             3300 ml   Net          -2722.1 ml       Lines/Drains/Airways     Drain                 Urethral Catheter 04/13/17 2121 Latex 14 Fr. 1 day          Airway                 Airway - Non-Surgical 04/13/17 2059 1 day          Peripheral Intravenous Line                 Peripheral IV - Single Lumen 04/14/17 0128 Right Forearm 1 day                Physical Exam   HENT:   Head: Normocephalic and atraumatic.   Neck: No JVD present.   Cardiovascular: Regular rhythm and normal heart sounds.  Tachycardia present.  Exam reveals no gallop and no friction  rub.    No murmur heard.  Pulmonary/Chest: Effort normal and breath sounds normal. He has no wheezes. He has no rales.   Abdominal: Soft. Bowel sounds are normal. He exhibits no distension. There is no tenderness.   Musculoskeletal: He exhibits no edema.   Skin: Skin is warm and dry. No erythema.   Vitals reviewed.      Significant Labs:   ABG:   Recent Labs  Lab 04/13/17  2340 04/14/17  0944 04/15/17  0620   PH 7.126* 7.363 7.392   PCO2 82.9* 48.1* 44.6   HCO3 27.3 27.4 27.1   POCSATURATED 98 95 94*   BE -2 2 2   , BMP:   Recent Labs  Lab 04/13/17  2146 04/14/17  0611 04/15/17  0323   * 87 99    140 138   K 4.2 5.3* 4.3    105 105   CO2 19* 26 24   BUN 21* 24* 28*   CREATININE 1.7* 1.5* 1.6*   CALCIUM 8.6* 8.7 9.0   MG 2.0 2.2 2.1   , CBC   Recent Labs  Lab 04/13/17 2146 04/15/17  0323   WBC 12.59 5.85   HGB 13.4* 13.0*   HCT 40.8 38.6*    203   , INR   Recent Labs  Lab 04/13/17 2146   INR 1.0    and Troponin   Recent Labs  Lab 04/14/17  0611 04/14/17  1144 04/15/17  0323   TROPONINI 0.170* 0.253* 0.248*       Significant Imaging: Echocardiogram:   2D echo with color flow doppler:   Results for orders placed or performed during the hospital encounter of 04/13/17   2D echo with color flow doppler   Result Value Ref Range    EF 15 (A) 55 - 65    Mitral Valve Regurgitation MODERATE (A)     Est. PA Systolic Pressure 30.09     Pericardial Effusion NONE     Tricuspid Valve Regurgitation TRIVIAL     and X-Ray: CXR: X-Ray Chest 1 View (CXR): No results found for this visit on 04/13/17.    Assessment and Plan:     Brief HPI: no new events, remains intubated    Acute hypercapnic respiratory failure  Intubated.  Managed by critical care team.  Start enteral feeding.    Cocaine abuse  Cocaine cessation counselling once patient is extubated and alert.  This is likely the cause of his cardiomyopathy.    Acute systolic congestive heart failure  Respiratory failure with likely component of CHF.  EF 20%  on TTE - likely due to cocaine.  Does not appear to be in cardiogenic shock.    -Lasix 40mg IV again today  -Coreg 3.125mg bid - tolerating  -ACEi if Cr improves  -Will require cardiac cath once extubated, stabilized, CR improving.  - Smoking cessation and cessation of polysubstance abuse once awake.    Acute kidney injury  Stable at 1.6.  Add potassium chloride IV    Cigarette smoker  Smoking cessation counselling once the patient is extubated and alert.      VTE Risk Mitigation         Ordered     enoxaparin injection 40 mg  Daily     Route:  Subcutaneous        04/14/17 0140     Medium Risk of VTE  Once      04/14/17 0140          Robin Chandra MD  Cardiology  [facility]

## 2017-04-15 NOTE — PLAN OF CARE
"Wife now at bedside, beginning to argue with patient's sister and mother in front of patient. Pt's sister asked me to call security because she was worried the altercation would become violent. Michelle, charge nurse, in room trying to de-escalate situation. Pt beginning to cry. Family stepped out of the room, but continued to argue in the [place]ley,as well as come into another patient's room to speak with me, continuing to argue.  arrived to unit, family told they must leave unit as it is interfering with patient's care. Wife angrily stormed out of unit, referring to the ICU staff as "ignorant".   Spoke with patient after family left. Per patient, family does not get along. He would still like wife to visit, but thinks it's best if the wife and family are not here at the same time.  "

## 2017-04-15 NOTE — ASSESSMENT & PLAN NOTE
Cocaine cessation counselling once patient is extubated and alert.  This is likely the cause of his cardiomyopathy.

## 2017-04-15 NOTE — SUBJECTIVE & OBJECTIVE
Interval History: no new events    Review of Systems   Unable to perform ROS: intubated     Objective:     Vital Signs (Most Recent):  Temp: 97.4 °F (36.3 °C) (04/15/17 0705)  Pulse: (!) 114 (04/15/17 0932)  Resp: 18 (04/15/17 0932)  BP: (!) 171/95 (04/15/17 0932)  SpO2: (!) 93 % (04/15/17 0932) Vital Signs (24h Range):  Temp:  [97.4 °F (36.3 °C)-99.3 °F (37.4 °C)] 97.4 °F (36.3 °C)  Pulse:  [] 114  Resp:  [9-34] 18  SpO2:  [91 %-100 %] 93 %  BP: ()/() 171/95     Weight: 74.3 kg (163 lb 12.8 oz)  Body mass index is 24.91 kg/(m^2).     SpO2: (!) 93 %  O2 Device (Oxygen Therapy): ventilator      Intake/Output Summary (Last 24 hours) at 04/15/17 1019  Last data filed at 04/15/17 0900   Gross per 24 hour   Intake            577.9 ml   Output             3300 ml   Net          -2722.1 ml       Lines/Drains/Airways     Drain                 Urethral Catheter 04/13/17 2121 Latex 14 Fr. 1 day          Airway                 Airway - Non-Surgical 04/13/17 2059 1 day          Peripheral Intravenous Line                 Peripheral IV - Single Lumen 04/14/17 0128 Right Forearm 1 day                Physical Exam   HENT:   Head: Normocephalic and atraumatic.   Neck: No JVD present.   Cardiovascular: Regular rhythm and normal heart sounds.  Tachycardia present.  Exam reveals no gallop and no friction rub.    No murmur heard.  Pulmonary/Chest: Effort normal and breath sounds normal. He has no wheezes. He has no rales.   Abdominal: Soft. Bowel sounds are normal. He exhibits no distension. There is no tenderness.   Musculoskeletal: He exhibits no edema.   Skin: Skin is warm and dry. No erythema.   Vitals reviewed.      Significant Labs:   ABG:   Recent Labs  Lab 04/13/17  2340 04/14/17  0944 04/15/17  0620   PH 7.126* 7.363 7.392   PCO2 82.9* 48.1* 44.6   HCO3 27.3 27.4 27.1   POCSATURATED 98 95 94*   BE -2 2 2   , BMP:   Recent Labs  Lab 04/13/17  2146 04/14/17  0611 04/15/17  0323   * 87 99    140 138    K 4.2 5.3* 4.3    105 105   CO2 19* 26 24   BUN 21* 24* 28*   CREATININE 1.7* 1.5* 1.6*   CALCIUM 8.6* 8.7 9.0   MG 2.0 2.2 2.1   , CBC   Recent Labs  Lab 04/13/17  2146 04/15/17  0323   WBC 12.59 5.85   HGB 13.4* 13.0*   HCT 40.8 38.6*    203   , INR   Recent Labs  Lab 04/13/17  2146   INR 1.0    and Troponin   Recent Labs  Lab 04/14/17  0611 04/14/17  1144 04/15/17  0323   TROPONINI 0.170* 0.253* 0.248*       Significant Imaging: Echocardiogram:   2D echo with color flow doppler:   Results for orders placed or performed during the hospital encounter of 04/13/17   2D echo with color flow doppler   Result Value Ref Range    EF 15 (A) 55 - 65    Mitral Valve Regurgitation MODERATE (A)     Est. PA Systolic Pressure 30.09     Pericardial Effusion NONE     Tricuspid Valve Regurgitation TRIVIAL     and X-Ray: CXR: X-Ray Chest 1 View (CXR): No results found for this visit on 04/13/17.

## 2017-04-15 NOTE — PLAN OF CARE
Pt intubated and sedation with fentanyl and propofol. Pt opens eyes to voice and follows commands. Restrains to bilateral wrist to prevent pulling ET tube. No acute issue overnight. CAM score is -2. Safety maintain.

## 2017-04-15 NOTE — PROGRESS NOTES
[facility] Progress Note  4/15/2017     Last 24 Hours:  --negative 3L  --Failed SBT    Subjective:  Unable to assess    Objective:  Vitals:    04/15/17 0856 04/15/17 0900 04/15/17 0932 04/15/17 1101   BP:  (!) 157/106 (!) 171/95    BP Location:       Patient Position:       BP Method:       Pulse: 97 103 (!) 114    Resp: 12 (!) 21 18    Temp:    98.4 °F (36.9 °C)   TempSrc:    Axillary   SpO2: 100% 100% (!) 93%    Weight:       Height:         NAD, sedated  ETT in place with some pink frothy sputum  No murmur, RRR  No wheezing or crackles  S/nt/nd  No edema/clubbing/cyanosis    Labs:  WBC 6  Cr 1.6 (1.5 yesterday, unknown baseline)  7.39/45/73    Imaging:  CXR with bilateral airspace opacities   TTE-EF 15%, moderate MR, sPAP 30, biatrial enlargement    Assessment:  1. Hypoxic respiratory failure-2/2 flash pulmonary edema-improving  2. HFrEF-ICM vs NICM  3. JUAN vs. CKD-unknown baseline, but stable since admit  4. Polysubstance Abuse    Plan:   1. Switch from propofol to precedex, c/w fentanyl   2. LTV, daily SAT/SBT, will plan to extubate to BIPAP  3. Plan for LHC after extubated, c/w Diuresis, consider adding ASA, needs BB and ACEI, but long term BB use could be an issue if he continues to use cocaine; will hold off adding an ACEI until it's clear his renal function has stabalized  4. Would check UA and renal ultrasound  5. Would start TF if not extubated today  6. Lovenox/Pepcid    Thank you for the consult    Neftaly Mansfield MD  [employer] Pulmonary Fellow  [phone]

## 2017-04-15 NOTE — PLAN OF CARE
Problem: Patient Care Overview  Goal: Plan of Care Review  Outcome: Ongoing (interventions implemented as appropriate)  Patient on  with documented settings.  Alarms are set and functioning with adequate volumes.  AMBU bag and mask at bedside.  Will continue to monitor.

## 2017-04-15 NOTE — PLAN OF CARE
04/15/17 1056   Discharge Assessment   Assessment Type Discharge Planning Assessment   Confirmed/corrected address and phone number on facesheet? Yes   Assessment information obtained from? Caregiver;Other  (Wallace Harrington(brother)[phone]/ Nadya Harrington(wife)[phone])   Prior to hospitilization cognitive status: Alert/Oriented   Prior to hospitalization functional status: Independent   Current cognitive status: Coma/Sedated/Intubated   Current Functional Status: Completely Dependent   Arrived From home or self-care   Lives With parent(s)  (Pt's brother states his mother just moved in with him about a week ago.)   Able to Return to Prior Arrangements yes   Is patient able to care for self after discharge? Unable to determine at this time (comments)   How many people do you have in your home that can help with your care after discharge? 1   Who are your caregiver(s) and their phone number(s)? Nadya Harrington(wife)[phone]   Patient's perception of discharge disposition home or selfcare   Readmission Within The Last 30 Days no previous admission in last 30 days   Patient currently being followed by outpatient case management? No   Patient currently receives home health services? No   Does the patient currently use HME? No   Patient currently receives private duty nursing? No   Patient currently receives any other outside agency services? No   Equipment Currently Used at Home none   Do you have any problems affording any of your prescribed medications? No   Is the patient taking medications as prescribed? yes   Do you have any financial concerns preventing you from receiving the healthcare you need? No   Does the patient have transportation to healthcare appointments? Yes   Transportation Available family or friend will provide;car   On Dialysis? No   Does the patient receive services at the Coumadin Clinic? No   Are there any open cases? No   Discharge Plan A Home with family   Discharge Plan B Home  Health   Patient/Family In Agreement With Plan yes   The Sw spoke to the pt's wife and his brother to complete the assessment b/c the pt's currently intubated in the ICU. The Sw spoke to them via phone. The pt's wife states she currently lives in [place] and has been living separately from the pt for about a year and 4 months but states they're still (18years). She states if she has to she will move back into the [facility] and care for the pt b/c he's still her  and doesn't want to see anything bad happen to him. She states she will arrive later to the hospital. She states the pt has been healthy and hasn't seen a doctor in years.

## 2017-04-15 NOTE — ASSESSMENT & PLAN NOTE
Respiratory failure with likely component of CHF.  EF 20% on TTE - likely due to cocaine.  Does not appear to be in cardiogenic shock.    -Lasix 40mg IV again today  -Coreg 3.125mg bid - tolerating  -ACEi if Cr improves  -Will require cardiac cath once extubated, stabilized, CR improving.  - Smoking cessation and cessation of polysubstance abuse once awake.

## 2017-04-16 LAB
ALBUMIN SERPL BCP-MCNC: 3.3 G/DL
ALP SERPL-CCNC: 105 U/L
ALT SERPL W/O P-5'-P-CCNC: 58 U/L
ANION GAP SERPL CALC-SCNC: 10 MMOL/L
ANION GAP SERPL CALC-SCNC: 12 MMOL/L
AST SERPL-CCNC: 59 U/L
BACTERIA #/AREA URNS HPF: ABNORMAL /HPF
BASOPHILS # BLD AUTO: 0.03 K/UL
BASOPHILS NFR BLD: 0.3 %
BILIRUB DIRECT SERPL-MCNC: 0.3 MG/DL
BILIRUB SERPL-MCNC: 1.1 MG/DL
BILIRUB UR QL STRIP: ABNORMAL
BUN SERPL-MCNC: 27 MG/DL
BUN SERPL-MCNC: 30 MG/DL
CALCIUM SERPL-MCNC: 9.1 MG/DL
CALCIUM SERPL-MCNC: 9.5 MG/DL
CHLORIDE SERPL-SCNC: 102 MMOL/L
CHLORIDE SERPL-SCNC: 103 MMOL/L
CLARITY UR: CLEAR
CO2 SERPL-SCNC: 26 MMOL/L
CO2 SERPL-SCNC: 26 MMOL/L
COLOR UR: ABNORMAL
CREAT SERPL-MCNC: 1.4 MG/DL
CREAT SERPL-MCNC: 1.5 MG/DL
DIFFERENTIAL METHOD: ABNORMAL
EOSINOPHIL # BLD AUTO: 0.1 K/UL
EOSINOPHIL NFR BLD: 0.8 %
ERYTHROCYTE [DISTWIDTH] IN BLOOD BY AUTOMATED COUNT: 15.4 %
EST. GFR  (AFRICAN AMERICAN): >60 ML/MIN/1.73 M^2
EST. GFR  (AFRICAN AMERICAN): >60 ML/MIN/1.73 M^2
EST. GFR  (NON AFRICAN AMERICAN): 53 ML/MIN/1.73 M^2
EST. GFR  (NON AFRICAN AMERICAN): 57 ML/MIN/1.73 M^2
GLUCOSE SERPL-MCNC: 106 MG/DL
GLUCOSE SERPL-MCNC: 119 MG/DL
GLUCOSE UR QL STRIP: NEGATIVE
HCT VFR BLD AUTO: 39 %
HGB BLD-MCNC: 13.4 G/DL
HGB UR QL STRIP: ABNORMAL
KETONES UR QL STRIP: NEGATIVE
LEUKOCYTE ESTERASE UR QL STRIP: NEGATIVE
LYMPHOCYTES # BLD AUTO: 1.4 K/UL
LYMPHOCYTES NFR BLD: 16.1 %
MAGNESIUM SERPL-MCNC: 2.1 MG/DL
MCH RBC QN AUTO: 30.4 PG
MCHC RBC AUTO-ENTMCNC: 34.4 %
MCV RBC AUTO: 88 FL
MICROSCOPIC COMMENT: ABNORMAL
MONOCYTES # BLD AUTO: 1.3 K/UL
MONOCYTES NFR BLD: 14.9 %
NEUTROPHILS # BLD AUTO: 6.1 K/UL
NEUTROPHILS NFR BLD: 67.8 %
NITRITE UR QL STRIP: NEGATIVE
PH UR STRIP: 6 [PH] (ref 5–8)
PHOSPHATE SERPL-MCNC: 3.1 MG/DL
PLATELET # BLD AUTO: 226 K/UL
PMV BLD AUTO: 9.6 FL
POTASSIUM SERPL-SCNC: 3.8 MMOL/L
POTASSIUM SERPL-SCNC: 3.9 MMOL/L
PROT SERPL-MCNC: 7.5 G/DL
PROT UR QL STRIP: ABNORMAL
RBC # BLD AUTO: 4.41 M/UL
RBC #/AREA URNS HPF: 20 /HPF (ref 0–4)
SODIUM SERPL-SCNC: 138 MMOL/L
SODIUM SERPL-SCNC: 141 MMOL/L
SP GR UR STRIP: 1.02 (ref 1–1.03)
SQUAMOUS #/AREA URNS HPF: 1 /HPF
URN SPEC COLLECT METH UR: ABNORMAL
UROBILINOGEN UR STRIP-ACNC: NEGATIVE EU/DL
WBC # BLD AUTO: 8.93 K/UL
WBC #/AREA URNS HPF: 1 /HPF (ref 0–5)

## 2017-04-16 PROCEDURE — 36415 COLL VENOUS BLD VENIPUNCTURE: CPT

## 2017-04-16 PROCEDURE — 63600175 PHARM REV CODE 636 W HCPCS: Performed by: HOSPITALIST

## 2017-04-16 PROCEDURE — 99900035 HC TECH TIME PER 15 MIN (STAT)

## 2017-04-16 PROCEDURE — 27000221 HC OXYGEN, UP TO 24 HOURS

## 2017-04-16 PROCEDURE — 99232 SBSQ HOSP IP/OBS MODERATE 35: CPT | Mod: ,,, | Performed by: INTERNAL MEDICINE

## 2017-04-16 PROCEDURE — 94761 N-INVAS EAR/PLS OXIMETRY MLT: CPT

## 2017-04-16 PROCEDURE — 25000003 PHARM REV CODE 250: Performed by: INTERNAL MEDICINE

## 2017-04-16 PROCEDURE — 83735 ASSAY OF MAGNESIUM: CPT

## 2017-04-16 PROCEDURE — 25000003 PHARM REV CODE 250: Performed by: NURSE PRACTITIONER

## 2017-04-16 PROCEDURE — 25000003 PHARM REV CODE 250: Performed by: PSYCHIATRY & NEUROLOGY

## 2017-04-16 PROCEDURE — 80076 HEPATIC FUNCTION PANEL: CPT

## 2017-04-16 PROCEDURE — 85025 COMPLETE CBC W/AUTO DIFF WBC: CPT

## 2017-04-16 PROCEDURE — 80048 BASIC METABOLIC PNL TOTAL CA: CPT | Mod: 91

## 2017-04-16 PROCEDURE — 81000 URINALYSIS NONAUTO W/SCOPE: CPT

## 2017-04-16 PROCEDURE — 94660 CPAP INITIATION&MGMT: CPT

## 2017-04-16 PROCEDURE — 80048 BASIC METABOLIC PNL TOTAL CA: CPT

## 2017-04-16 PROCEDURE — 11000001 HC ACUTE MED/SURG PRIVATE ROOM

## 2017-04-16 PROCEDURE — 84100 ASSAY OF PHOSPHORUS: CPT

## 2017-04-16 PROCEDURE — 25000003 PHARM REV CODE 250: Performed by: HOSPITALIST

## 2017-04-16 RX ORDER — FUROSEMIDE 40 MG/1
40 TABLET ORAL DAILY
Status: DISCONTINUED | OUTPATIENT
Start: 2017-04-16 | End: 2017-04-17 | Stop reason: HOSPADM

## 2017-04-16 RX ORDER — SERTRALINE HYDROCHLORIDE 50 MG/1
50 TABLET, FILM COATED ORAL DAILY
Status: DISCONTINUED | OUTPATIENT
Start: 2017-04-18 | End: 2017-04-17 | Stop reason: HOSPADM

## 2017-04-16 RX ORDER — SERTRALINE HYDROCHLORIDE 25 MG/1
25 TABLET, FILM COATED ORAL DAILY
Status: COMPLETED | OUTPATIENT
Start: 2017-04-16 | End: 2017-04-17

## 2017-04-16 RX ADMIN — ENOXAPARIN SODIUM 40 MG: 100 INJECTION SUBCUTANEOUS at 06:04

## 2017-04-16 RX ADMIN — FAMOTIDINE 20 MG: 10 INJECTION, SOLUTION INTRAVENOUS at 09:04

## 2017-04-16 RX ADMIN — CARVEDILOL 3.12 MG: 3.12 TABLET, FILM COATED ORAL at 09:04

## 2017-04-16 RX ADMIN — SERTRALINE HYDROCHLORIDE 25 MG: 25 TABLET ORAL at 03:04

## 2017-04-16 RX ADMIN — FUROSEMIDE 40 MG: 40 TABLET ORAL at 01:04

## 2017-04-16 NOTE — SUBJECTIVE & OBJECTIVE
Interval History: No events overnight. Urinating well with the diuresis. Starting to wake up some.     Review of Systems   Unable to perform ROS: Intubated     Objective:     Vital Signs (Most Recent):  Temp: 99 °F (37.2 °C) (04/15/17 1915)  Pulse: 97 (04/15/17 2100)  Resp: (!) 21 (04/15/17 2100)  BP: 113/77 (04/15/17 2100)  SpO2: 97 % (04/15/17 2100) Vital Signs (24h Range):  Temp:  [97.4 °F (36.3 °C)-99 °F (37.2 °C)] 99 °F (37.2 °C)  Pulse:  [] 97  Resp:  [9-35] 21  SpO2:  [89 %-100 %] 97 %  BP: ()/() 113/77     Weight: 74.3 kg (163 lb 12.8 oz)  Body mass index is 24.91 kg/(m^2).    Intake/Output Summary (Last 24 hours) at 04/15/17 2312  Last data filed at 04/15/17 1800   Gross per 24 hour   Intake           447.73 ml   Output             1570 ml   Net         -1122.27 ml      Physical Exam   Constitutional: He appears well-developed and well-nourished. No distress. He is intubated.   Cardiovascular: Normal rate and regular rhythm.    Pulmonary/Chest: Effort normal. He is intubated. No respiratory distress. He has rales.   Abdominal: Soft. Bowel sounds are normal. He exhibits no distension. There is no tenderness.   Musculoskeletal: He exhibits edema. He exhibits no tenderness.   Skin: Skin is warm and dry.   Nursing note and vitals reviewed.      Significant Labs:   BMP:   Recent Labs  Lab 04/15/17  0323   GLU 99      K 4.3      CO2 24   BUN 28*   CREATININE 1.6*   CALCIUM 9.0   MG 2.1     CBC:   Recent Labs  Lab 04/15/17  0323   WBC 5.85   HGB 13.0*   HCT 38.6*        Troponin:   Recent Labs  Lab 04/14/17  0611 04/14/17  1144 04/15/17  0323   TROPONINI 0.170* 0.253* 0.248*       Significant Imaging: I have reviewed all pertinent imaging results/findings within the past 24 hours.

## 2017-04-16 NOTE — SUBJECTIVE & OBJECTIVE
Interval History: extubated yesterday, transferred to floor.    Review of Systems   Constitution: Negative for diaphoresis, weakness and malaise/fatigue.   HENT: Negative for nosebleeds.    Cardiovascular: Negative for chest pain, claudication, cyanosis, dyspnea on exertion, irregular heartbeat, leg swelling, near-syncope, orthopnea, palpitations, paroxysmal nocturnal dyspnea and syncope.   Respiratory: Negative for cough, hemoptysis, shortness of breath, sleep disturbances due to breathing, snoring, sputum production and wheezing.    Hematologic/Lymphatic: Negative for bleeding problem. Does not bruise/bleed easily.   Skin: Negative for poor wound healing and rash.   Gastrointestinal: Negative for heartburn, hematemesis, hematochezia and melena.   Neurological: Negative for dizziness, light-headedness and loss of balance.   Psychiatric/Behavioral: Negative for altered mental status and depression.     Objective:     Vital Signs (Most Recent):  Temp: 99.2 °F (37.3 °C) (04/16/17 1200)  Pulse: 65 (04/16/17 1200)  Resp: 20 (04/16/17 1200)  BP: (!) 142/71 (04/16/17 1200)  SpO2: 95 % (04/16/17 1255) Vital Signs (24h Range):  Temp:  [97.8 °F (36.6 °C)-99.2 °F (37.3 °C)] 99.2 °F (37.3 °C)  Pulse:  [] 65  Resp:  [15-35] 20  SpO2:  [89 %-100 %] 95 %  BP: ()/(59-99) 142/71     Weight: 74.3 kg (163 lb 12.8 oz)  Body mass index is 24.91 kg/(m^2).     SpO2: 95 %  O2 Device (Oxygen Therapy): nasal cannula      Intake/Output Summary (Last 24 hours) at 04/16/17 1451  Last data filed at 04/16/17 0900   Gross per 24 hour   Intake           203.07 ml   Output             1260 ml   Net         -1056.93 ml       Lines/Drains/Airways     Drain                 Urethral Catheter 04/13/17 2121 Latex 14 Fr. 2 days          Peripheral Intravenous Line                 Peripheral IV - Single Lumen 04/14/17 0128 Right Forearm 2 days                Physical Exam   Constitutional: He is oriented to person, place, and time. He appears  well-developed and well-nourished. No distress. He is not intubated.   HENT:   Head: Normocephalic and atraumatic.   Neck: No JVD present.   Cardiovascular: Normal rate, regular rhythm, S1 normal, S2 normal, normal heart sounds and intact distal pulses.  PMI is not displaced.  Exam reveals no gallop, no S3, no S4, no distant heart sounds, no friction rub, no midsystolic click and no opening snap.    No murmur heard.  Pulses:       Carotid pulses are 2+ on the right side, and 2+ on the left side.       Radial pulses are 2+ on the right side, and 2+ on the left side.   Pulmonary/Chest: Effort normal and breath sounds normal. No accessory muscle usage. No apnea, no tachypnea and no bradypnea. He is not intubated. No respiratory distress. He has no decreased breath sounds. He has no wheezes. He has no rhonchi. He has no rales. He exhibits no tenderness.   Abdominal: Soft. Normal aorta and bowel sounds are normal. He exhibits no distension, no abdominal bruit, no pulsatile midline mass and no mass. There is no tenderness.   Musculoskeletal: He exhibits no edema.   Neurological: He is alert and oriented to person, place, and time.   Skin: Skin is warm. No rash noted. No erythema. No pallor.   Psychiatric: He has a normal mood and affect. His behavior is normal. Judgment and thought content normal.   Vitals reviewed.      Significant Labs:   BMP:   Recent Labs  Lab 04/15/17  0323 04/16/17  0319   GLU 99 106    138   K 4.3 3.9    102   CO2 24 26   BUN 28* 30*   CREATININE 1.6* 1.5*   CALCIUM 9.0 9.1   MG 2.1 2.1   , CMP   Recent Labs  Lab 04/15/17  0323 04/16/17  0319    138   K 4.3 3.9    102   CO2 24 26   GLU 99 106   BUN 28* 30*   CREATININE 1.6* 1.5*   CALCIUM 9.0 9.1   PROT 7.2 7.5   ALBUMIN 3.4* 3.3*   BILITOT 0.6 1.1*   ALKPHOS 113 105   AST 39 59*   ALT 71* 58*   ANIONGAP 9 10   ESTGFRAFRICA 56* >60   EGFRNONAA 49* 53*   , CBC   Recent Labs  Lab 04/15/17  0323 04/16/17  0319   WBC 5.85 8.93    HGB 13.0* 13.4*   HCT 38.6* 39.0*    226   , INR No results for input(s): INR, PROTIME in the last 48 hours., Lipid Panel No results for input(s): CHOL, HDL, LDLCALC, TRIG, CHOLHDL in the last 48 hours. and Troponin   Recent Labs  Lab 04/15/17  0323   TROPONINI 0.248*       Significant Imaging: Echocardiogram:   2D echo with color flow doppler:   Results for orders placed or performed during the hospital encounter of 04/13/17   2D echo with color flow doppler   Result Value Ref Range    EF 15 (A) 55 - 65    Mitral Valve Regurgitation MODERATE (A)     Est. PA Systolic Pressure 30.09     Pericardial Effusion NONE     Tricuspid Valve Regurgitation TRIVIAL     and X-Ray: CXR: X-Ray Chest 1 View (CXR): No results found for this visit on 04/13/17. and X-Ray Chest PA and Lateral (CXR): No results found for this visit on 04/13/17.

## 2017-04-16 NOTE — ASSESSMENT & PLAN NOTE
Continue more IV furosemide, Echocardiogram with significant findings. Likely cocaine and/or BP induced. Daily weights. Low salt diet when able. Appreicate Cardiology assistance.

## 2017-04-16 NOTE — PLAN OF CARE
Problem: Patient Care Overview  Goal: Plan of Care Review  Outcome: Ongoing (interventions implemented as appropriate)  Pt extubated to Bipap today, [place] well. Lasix given with 1.2 L UO over the past 12 hours. Medical Power of  transferred to Keyonna Ruiz (sister). Plan of care reviewed with patient and family.

## 2017-04-16 NOTE — PROGRESS NOTES
[facility]  Progress Note    Patient Name: Oral Harrington  MRN: [id number]  Patient Class: IP- Inpatient   Admission Date: 4/13/2017  Length of Stay: 1 days  Attending Physician: Russel Blanchard MD  Primary Care Provider: No primary care provider on file.        Subjective:     Principal Problem:Cocaine intoxication with complication    HPI:  Oral Harrington is a 51 y.o. black man with cigarette smoking and cocaine abuse. He lives in [place], [place].     He was taken to [facility] ED on 4/13/17 in respiratory distress with tachycardia and hypertension. He required BiPAP in the ambulance. Upon arrival, he was intubated and put on propofol. Chest x-ray showed diffuse bilateral airspace opacities. Labs showed respiratory acidosis with hypercapnia (pH 7.092, pCO2 82.6, bicarbonate 19), renal dysfunction (BUN 21, creatinine 1.7), elevated transaminases (, ), elevated troponin (0.099) and BNP (298). Urine toxicology was positive for cocaine. EKG showed no ST elevations. He was put on midazolam drip to treat cocaine intoxication. He was admitted to [facility].       Hospital Course:       Interval History: No events overnight. Urinating well with the diuresis. Starting to wake up some.     Review of Systems   Unable to perform ROS: Intubated     Objective:     Vital Signs (Most Recent):  Temp: 99 °F (37.2 °C) (04/15/17 1915)  Pulse: 97 (04/15/17 2100)  Resp: (!) 21 (04/15/17 2100)  BP: 113/77 (04/15/17 2100)  SpO2: 97 % (04/15/17 2100) Vital Signs (24h Range):  Temp:  [97.4 °F (36.3 °C)-99 °F (37.2 °C)] 99 °F (37.2 °C)  Pulse:  [] 97  Resp:  [9-35] 21  SpO2:  [89 %-100 %] 97 %  BP: ()/() 113/77     Weight: 74.3 kg (163 lb 12.8 oz)  Body mass index is 24.91 kg/(m^2).    Intake/Output Summary (Last 24 hours) at 04/15/17 2312  Last data filed at 04/15/17 1800   Gross per 24 hour   Intake           447.73 ml   Output              1570 ml   Net         -1122.27 ml      Physical Exam   Constitutional: He appears well-developed and well-nourished. No distress. He is intubated.   Cardiovascular: Normal rate and regular rhythm.    Pulmonary/Chest: Effort normal. He is intubated. No respiratory distress. He has rales.   Abdominal: Soft. Bowel sounds are normal. He exhibits no distension. There is no tenderness.   Musculoskeletal: He exhibits edema. He exhibits no tenderness.   Skin: Skin is warm and dry.   Nursing note and vitals reviewed.      Significant Labs:   BMP:   Recent Labs  Lab 04/15/17  0323   GLU 99      K 4.3      CO2 24   BUN 28*   CREATININE 1.6*   CALCIUM 9.0   MG 2.1     CBC:   Recent Labs  Lab 04/15/17  0323   WBC 5.85   HGB 13.0*   HCT 38.6*        Troponin:   Recent Labs  Lab 04/14/17  0611 04/14/17  1144 04/15/17  0323   TROPONINI 0.170* 0.253* 0.248*       Significant Imaging: I have reviewed all pertinent imaging results/findings within the past 24 hours.    Assessment/Plan:      * Cocaine intoxication with complication  Cocaine abuse  Continue midazolam. Phentolamine 5 mg IV q4h PRN for tachycardia and hypertension. Avoid beta-blockers.     Acute hypercapnic respiratory failure  Acute pulmonary edema  Intubated. Wean ventilator when pulmonary edema improves.Appreciate Pulmonary assistance.     Acute systolic congestive heart failure  Continue more IV furosemide, Echocardiogram with significant findings. Likely cocaine and/or BP induced. Daily weights. Low salt diet when able. Appreicate Cardiology assistance.     Acute kidney injury  Creatinine is stable. Unknown baseline.       VTE Risk Mitigation         Ordered     enoxaparin injection 40 mg  Daily     Route:  Subcutaneous        04/14/17 0140     Medium Risk of VTE  Once      04/14/17 0140          Russel Blanchard MD  Department of Hospital Medicine   [facility]

## 2017-04-16 NOTE — PLAN OF CARE
Problem: Patient Care Overview  Goal: Plan of Care Review  Outcome: Ongoing (interventions implemented as appropriate)  AAOx4, sats 95% on 2 L O2 NC and BIPAP night time. Pt is calm and follows commands. Very pleasant. No s/s of cocaine withdraw noted. Mild SOB on exertion. No acute issue overnight.

## 2017-04-16 NOTE — PROGRESS NOTES
[facility]  Cardiology  Progress Note    Patient Name: Oral Harrington  MRN: [id number]  Admission Date: 4/13/2017  Hospital Length of Stay: 2 days  Code Status: Full Code   Attending Physician: Russel Blanchard MD   Primary Care Physician: No primary care provider on file.  Expected Discharge Date:   Principal Problem:Cocaine intoxication with complication    Subjective:     Hospital Course:   TTE  EF 20%.  Extubated yesterday.   Cr stable at 1.5.  Coreg initiated with good BP response.    Interval History: extubated yesterday, transferred to floor.    Review of Systems   Constitution: Negative for diaphoresis, weakness and malaise/fatigue.   HENT: Negative for nosebleeds.    Cardiovascular: Negative for chest pain, claudication, cyanosis, dyspnea on exertion, irregular heartbeat, leg swelling, near-syncope, orthopnea, palpitations, paroxysmal nocturnal dyspnea and syncope.   Respiratory: Negative for cough, hemoptysis, shortness of breath, sleep disturbances due to breathing, snoring, sputum production and wheezing.    Hematologic/Lymphatic: Negative for bleeding problem. Does not bruise/bleed easily.   Skin: Negative for poor wound healing and rash.   Gastrointestinal: Negative for heartburn, hematemesis, hematochezia and melena.   Neurological: Negative for dizziness, light-headedness and loss of balance.   Psychiatric/Behavioral: Negative for altered mental status and depression.     Objective:     Vital Signs (Most Recent):  Temp: 99.2 °F (37.3 °C) (04/16/17 1200)  Pulse: 65 (04/16/17 1200)  Resp: 20 (04/16/17 1200)  BP: (!) 142/71 (04/16/17 1200)  SpO2: 95 % (04/16/17 1255) Vital Signs (24h Range):  Temp:  [97.8 °F (36.6 °C)-99.2 °F (37.3 °C)] 99.2 °F (37.3 °C)  Pulse:  [] 65  Resp:  [15-35] 20  SpO2:  [89 %-100 %] 95 %  BP: ()/(59-99) 142/71     Weight: 74.3 kg (163 lb 12.8 oz)  Body mass index is 24.91 kg/(m^2).     SpO2: 95 %  O2 Device (Oxygen Therapy): nasal  cannula      Intake/Output Summary (Last 24 hours) at 04/16/17 1451  Last data filed at 04/16/17 0900   Gross per 24 hour   Intake           203.07 ml   Output             1260 ml   Net         -1056.93 ml       Lines/Drains/Airways     Drain                 Urethral Catheter 04/13/17 2121 Latex 14 Fr. 2 days          Peripheral Intravenous Line                 Peripheral IV - Single Lumen 04/14/17 0128 Right Forearm 2 days                Physical Exam   Constitutional: He is oriented to person, place, and time. He appears well-developed and well-nourished. No distress. He is not intubated.   HENT:   Head: Normocephalic and atraumatic.   Neck: No JVD present.   Cardiovascular: Normal rate, regular rhythm, S1 normal, S2 normal, normal heart sounds and intact distal pulses.  PMI is not displaced.  Exam reveals no gallop, no S3, no S4, no distant heart sounds, no friction rub, no midsystolic click and no opening snap.    No murmur heard.  Pulses:       Carotid pulses are 2+ on the right side, and 2+ on the left side.       Radial pulses are 2+ on the right side, and 2+ on the left side.   Pulmonary/Chest: Effort normal and breath sounds normal. No accessory muscle usage. No apnea, no tachypnea and no bradypnea. He is not intubated. No respiratory distress. He has no decreased breath sounds. He has no wheezes. He has no rhonchi. He has no rales. He exhibits no tenderness.   Abdominal: Soft. Normal aorta and bowel sounds are normal. He exhibits no distension, no abdominal bruit, no pulsatile midline mass and no mass. There is no tenderness.   Musculoskeletal: He exhibits no edema.   Neurological: He is alert and oriented to person, place, and time.   Skin: Skin is warm. No rash noted. No erythema. No pallor.   Psychiatric: He has a normal mood and affect. His behavior is normal. Judgment and thought content normal.   Vitals reviewed.      Significant Labs:   BMP:   Recent Labs  Lab 04/15/17  0323 04/16/17  0319   GLU 99  106    138   K 4.3 3.9    102   CO2 24 26   BUN 28* 30*   CREATININE 1.6* 1.5*   CALCIUM 9.0 9.1   MG 2.1 2.1   , CMP   Recent Labs  Lab 04/15/17  0323 04/16/17  0319    138   K 4.3 3.9    102   CO2 24 26   GLU 99 106   BUN 28* 30*   CREATININE 1.6* 1.5*   CALCIUM 9.0 9.1   PROT 7.2 7.5   ALBUMIN 3.4* 3.3*   BILITOT 0.6 1.1*   ALKPHOS 113 105   AST 39 59*   ALT 71* 58*   ANIONGAP 9 10   ESTGFRAFRICA 56* >60   EGFRNONAA 49* 53*   , CBC   Recent Labs  Lab 04/15/17  0323 04/16/17  0319   WBC 5.85 8.93   HGB 13.0* 13.4*   HCT 38.6* 39.0*    226   , INR No results for input(s): INR, PROTIME in the last 48 hours., Lipid Panel No results for input(s): CHOL, HDL, LDLCALC, TRIG, CHOLHDL in the last 48 hours. and Troponin   Recent Labs  Lab 04/15/17  0323   TROPONINI 0.248*       Significant Imaging: Echocardiogram:   2D echo with color flow doppler:   Results for orders placed or performed during the hospital encounter of 04/13/17   2D echo with color flow doppler   Result Value Ref Range    EF 15 (A) 55 - 65    Mitral Valve Regurgitation MODERATE (A)     Est. PA Systolic Pressure 30.09     Pericardial Effusion NONE     Tricuspid Valve Regurgitation TRIVIAL     and X-Ray: CXR: X-Ray Chest 1 View (CXR): No results found for this visit on 04/13/17. and X-Ray Chest PA and Lateral (CXR): No results found for this visit on 04/13/17.    Assessment and Plan:     Brief HPI: extubated yesterday.  Discussed cardiac catheterization, patient agreeable.    Acute hypercapnic respiratory failure  Resolved. extubated    Cocaine abuse  Cocaine cessation counselling once patient is extubated and alert.  This is likely the cause of his cardiomyopathy.    Acute systolic congestive heart failure  Respiratory failure with likely component of CHF.  EF 20% on TTE - likely due to cocaine.     -Coreg 3.125mg bid - titrate to 6.25mg tomorrow.  -ACEi if Cr improves  -Cardiac cath tomorrow  - Smoking cessation and  cessation of polysubstance abuse once awake.    Acute kidney injury  Stable at 1.5.      Cigarette smoker  Smoking cessation counselling       VTE Risk Mitigation         Ordered     enoxaparin injection 40 mg  Daily     Route:  Subcutaneous        04/14/17 0140     Medium Risk of VTE  Once      04/14/17 0140          Robin Chandra MD  Cardiology  [facility]

## 2017-04-16 NOTE — ASSESSMENT & PLAN NOTE
Respiratory failure with likely component of CHF.  EF 20% on TTE - likely due to cocaine.     -Coreg 3.125mg bid - titrate to 6.25mg tomorrow.  -ACEi if Cr improves  -Cardiac cath tomorrow  - Smoking cessation and cessation of polysubstance abuse once awake.

## 2017-04-16 NOTE — PLAN OF CARE
Problem: Patient Care Overview  Goal: Plan of Care Review  Outcome: Ongoing (interventions implemented as appropriate)  Pt remains free from injury or fall. Cardiac monitor continued. SOB on exertion. Denies any discomfort at rest.No s/s of withdraw, tolerating PO no s/s of swallowing difficulty.  Psych consult completed.Plan= cath lab in AM.

## 2017-04-16 NOTE — NURSING TRANSFER
Nursing Transfer Note      4/16/2017     Transfer To: 419    Transfer via wheelchair    Transfer with 2 L to O2    Transported by ANJELICA Jamil     Medicines sent: none    Chart send with patient: Yes    Notified: Will notify patient's sister Keyonna when she comes to hospital, per pts request    Patient reassessed at: to be reassessed by Ilsia    Upon arrival to floor: cardiac monitor applied, patient oriented to room, call bell in reach and bed in lowest position

## 2017-04-16 NOTE — NURSING TRANSFER
Nursing Transfer Note      4/16/2017     Transfer from ICU to Room 419    Transfer via wheelchair    Transfer with telemetry and personal belongings    Transported by Brittani DEJESUS    Medicines sent: no    Chart send with patient: yes    Notified: pt will notified family    Patient reassessed at: upon arrival no changes from report received    Upon arrival to floor: VS taken  telemetry initiated

## 2017-04-16 NOTE — PLAN OF CARE
Problem: Patient Care Overview  Goal: Plan of Care Review  Outcome: Ongoing (interventions implemented as appropriate)  Received pt on BIPAP 12/8 40%; will cont to monitor.

## 2017-04-16 NOTE — CONSULTS
Discharge this patient to home when medically stable.  Zoloft as per order.  Patient is not willing for rehab due to work and mother

## 2017-04-16 NOTE — PROGRESS NOTES
[facility] Progress Note  4/16/2017     Last 24 Hours:  --Extubated    Subjective:  No chest pain/SOB/Cough/fever/chills/palpitations     Objective:  Vitals:    04/16/17 0945 04/16/17 1000 04/16/17 1115 04/16/17 1200   BP:  125/77 (!) 138/91 (!) 142/71   BP Location:       Patient Position:       BP Method:       Pulse: 104 100 101 65   Resp: (!) 25 (!) 22 20 20   Temp:   98.3 °F (36.8 °C) 99.2 °F (37.3 °C)   TempSrc:   Oral Oral   SpO2: 97% 97%     Weight:       Height:         NAD, sedated  No murmur, RRR  No wheezing or crackles  S/nt/nd  No edema/clubbing/cyanosis    Labs:  Reviewed    Imaging:  CXR with bilateral airspace opacities   TTE-EF 15%, moderate MR, sPAP 30, biatrial enlargement    Assessment:  1. Hypoxic respiratory failure-2/2 flash pulmonary edema-improving  2. HFrEF-ICM vs NICM  3. JUAN vs. CKD-unknown baseline, but stable since admit  4. Polysubstance Abuse    Plan:   1. Wean O2 as tolerated  2. Cardiac w/u and HF meds per cardiology  3. Discussed smoking cessation and cocaine abstinence with patient.  4. Lovenox    Call with questions.    Neftaly Mansfield MD  [facility] Pulmonary Fellow  [phone]

## 2017-04-16 NOTE — PLAN OF CARE
Problem: Patient Care Overview  Goal: Plan of Care Review  Outcome: Ongoing (interventions implemented as appropriate)  Pt on BIPAP while sleeping.  Will wean to NC as tolerated when pt wakes up.

## 2017-04-17 VITALS
HEIGHT: 68 IN | OXYGEN SATURATION: 95 % | SYSTOLIC BLOOD PRESSURE: 117 MMHG | WEIGHT: 148.38 LBS | RESPIRATION RATE: 19 BRPM | HEART RATE: 90 BPM | TEMPERATURE: 98 F | DIASTOLIC BLOOD PRESSURE: 84 MMHG | BODY MASS INDEX: 22.49 KG/M2

## 2017-04-17 PROBLEM — I24.89 DEMAND ISCHEMIA: Status: RESOLVED | Noted: 2017-04-14 | Resolved: 2017-04-17

## 2017-04-17 PROBLEM — I24.89 DEMAND ISCHEMIA: Status: ACTIVE | Noted: 2017-04-14

## 2017-04-17 PROBLEM — R74.01 ELEVATED TRANSAMINASE LEVEL: Status: RESOLVED | Noted: 2017-04-14 | Resolved: 2017-04-17

## 2017-04-17 PROBLEM — J81.0 ACUTE PULMONARY EDEMA: Status: RESOLVED | Noted: 2017-04-15 | Resolved: 2017-04-17

## 2017-04-17 PROBLEM — J96.02 ACUTE HYPERCAPNIC RESPIRATORY FAILURE: Status: RESOLVED | Noted: 2017-04-14 | Resolved: 2017-04-17

## 2017-04-17 PROBLEM — I34.0 MITRAL REGURGITATION: Status: ACTIVE | Noted: 2017-04-17

## 2017-04-17 PROBLEM — N17.9 ACUTE KIDNEY INJURY: Status: RESOLVED | Noted: 2017-04-14 | Resolved: 2017-04-17

## 2017-04-17 PROBLEM — I42.9 CARDIOMYOPATHY: Status: ACTIVE | Noted: 2017-04-17

## 2017-04-17 PROBLEM — I50.9 ACUTE CONGESTIVE HEART FAILURE: Status: RESOLVED | Noted: 2017-04-15 | Resolved: 2017-04-17

## 2017-04-17 LAB
ALBUMIN SERPL BCP-MCNC: 3.2 G/DL
ALP SERPL-CCNC: 96 U/L
ALT SERPL W/O P-5'-P-CCNC: 49 U/L
ANION GAP SERPL CALC-SCNC: 10 MMOL/L
AST SERPL-CCNC: 53 U/L
BASOPHILS # BLD AUTO: 0.04 K/UL
BASOPHILS # BLD AUTO: 0.06 K/UL
BASOPHILS NFR BLD: 0.5 %
BASOPHILS NFR BLD: 0.8 %
BILIRUB DIRECT SERPL-MCNC: 0.3 MG/DL
BILIRUB SERPL-MCNC: 0.9 MG/DL
BUN SERPL-MCNC: 25 MG/DL
CALCIUM SERPL-MCNC: 9.1 MG/DL
CHLORIDE SERPL-SCNC: 103 MMOL/L
CO2 SERPL-SCNC: 25 MMOL/L
CREAT SERPL-MCNC: 1.2 MG/DL
DIFFERENTIAL METHOD: ABNORMAL
DIFFERENTIAL METHOD: ABNORMAL
EOSINOPHIL # BLD AUTO: 0.1 K/UL
EOSINOPHIL # BLD AUTO: 0.1 K/UL
EOSINOPHIL NFR BLD: 1 %
EOSINOPHIL NFR BLD: 1.7 %
ERYTHROCYTE [DISTWIDTH] IN BLOOD BY AUTOMATED COUNT: 14.9 %
ERYTHROCYTE [DISTWIDTH] IN BLOOD BY AUTOMATED COUNT: 15.1 %
EST. GFR  (AFRICAN AMERICAN): >60 ML/MIN/1.73 M^2
EST. GFR  (NON AFRICAN AMERICAN): >60 ML/MIN/1.73 M^2
GLUCOSE SERPL-MCNC: 104 MG/DL
HCT VFR BLD AUTO: 38.6 %
HCT VFR BLD AUTO: 38.8 %
HGB BLD-MCNC: 13.3 G/DL
HGB BLD-MCNC: 13.5 G/DL
INR PPP: 1
LYMPHOCYTES # BLD AUTO: 1.4 K/UL
LYMPHOCYTES # BLD AUTO: 1.8 K/UL
LYMPHOCYTES NFR BLD: 18.1 %
LYMPHOCYTES NFR BLD: 25.1 %
MAGNESIUM SERPL-MCNC: 2.1 MG/DL
MCH RBC QN AUTO: 30.1 PG
MCH RBC QN AUTO: 30.3 PG
MCHC RBC AUTO-ENTMCNC: 34.5 %
MCHC RBC AUTO-ENTMCNC: 34.8 %
MCV RBC AUTO: 87 FL
MCV RBC AUTO: 87 FL
MONOCYTES # BLD AUTO: 1.2 K/UL
MONOCYTES # BLD AUTO: 1.4 K/UL
MONOCYTES NFR BLD: 16.6 %
MONOCYTES NFR BLD: 18.3 %
NEUTROPHILS # BLD AUTO: 4 K/UL
NEUTROPHILS # BLD AUTO: 4.8 K/UL
NEUTROPHILS NFR BLD: 55.8 %
NEUTROPHILS NFR BLD: 62 %
PHOSPHATE SERPL-MCNC: 2.8 MG/DL
PLATELET # BLD AUTO: 224 K/UL
PLATELET # BLD AUTO: 242 K/UL
PLATELET BLD QL SMEAR: ABNORMAL
PMV BLD AUTO: 9.6 FL
PMV BLD AUTO: 9.8 FL
POTASSIUM SERPL-SCNC: 3.7 MMOL/L
PROT SERPL-MCNC: 7.6 G/DL
PROTHROMBIN TIME: 10.7 SEC
RBC # BLD AUTO: 4.42 M/UL
RBC # BLD AUTO: 4.46 M/UL
SODIUM SERPL-SCNC: 138 MMOL/L
WBC # BLD AUTO: 7.21 K/UL
WBC # BLD AUTO: 7.81 K/UL

## 2017-04-17 PROCEDURE — 36415 COLL VENOUS BLD VENIPUNCTURE: CPT

## 2017-04-17 PROCEDURE — 84100 ASSAY OF PHOSPHORUS: CPT

## 2017-04-17 PROCEDURE — 25000003 PHARM REV CODE 250: Performed by: NURSE PRACTITIONER

## 2017-04-17 PROCEDURE — 85610 PROTHROMBIN TIME: CPT

## 2017-04-17 PROCEDURE — 80076 HEPATIC FUNCTION PANEL: CPT

## 2017-04-17 PROCEDURE — 25000003 PHARM REV CODE 250: Performed by: HOSPITALIST

## 2017-04-17 PROCEDURE — 99233 SBSQ HOSP IP/OBS HIGH 50: CPT | Mod: ,,, | Performed by: INTERNAL MEDICINE

## 2017-04-17 PROCEDURE — 93010 ELECTROCARDIOGRAM REPORT: CPT | Mod: ,,, | Performed by: INTERNAL MEDICINE

## 2017-04-17 PROCEDURE — 83735 ASSAY OF MAGNESIUM: CPT

## 2017-04-17 PROCEDURE — 93005 ELECTROCARDIOGRAM TRACING: CPT

## 2017-04-17 PROCEDURE — 85025 COMPLETE CBC W/AUTO DIFF WBC: CPT

## 2017-04-17 PROCEDURE — 80048 BASIC METABOLIC PNL TOTAL CA: CPT

## 2017-04-17 PROCEDURE — 25000003 PHARM REV CODE 250: Performed by: PSYCHIATRY & NEUROLOGY

## 2017-04-17 RX ORDER — CARVEDILOL 6.25 MG/1
6.25 TABLET ORAL 2 TIMES DAILY
Qty: 60 TABLET | Refills: 3 | Status: SHIPPED | OUTPATIENT
Start: 2017-04-17 | End: 2018-04-17

## 2017-04-17 RX ORDER — LISINOPRIL 2.5 MG/1
2.5 TABLET ORAL DAILY
Status: DISCONTINUED | OUTPATIENT
Start: 2017-04-17 | End: 2017-04-17 | Stop reason: HOSPADM

## 2017-04-17 RX ORDER — SERTRALINE HYDROCHLORIDE 50 MG/1
50 TABLET, FILM COATED ORAL DAILY
Qty: 30 TABLET | Refills: 3 | Status: SHIPPED | OUTPATIENT
Start: 2017-04-18 | End: 2018-04-18

## 2017-04-17 RX ORDER — CARVEDILOL 6.25 MG/1
6.25 TABLET ORAL 2 TIMES DAILY
Status: DISCONTINUED | OUTPATIENT
Start: 2017-04-17 | End: 2017-04-17 | Stop reason: HOSPADM

## 2017-04-17 RX ORDER — LISINOPRIL 2.5 MG/1
2.5 TABLET ORAL DAILY
Qty: 30 TABLET | Refills: 3 | Status: SHIPPED | OUTPATIENT
Start: 2017-04-17 | End: 2018-04-17

## 2017-04-17 RX ORDER — FUROSEMIDE 40 MG/1
40 TABLET ORAL DAILY
Qty: 30 TABLET | Refills: 3 | Status: SHIPPED | OUTPATIENT
Start: 2017-04-17 | End: 2018-04-17

## 2017-04-17 RX ADMIN — CARVEDILOL 6.25 MG: 6.25 TABLET, FILM COATED ORAL at 08:04

## 2017-04-17 RX ADMIN — FAMOTIDINE 20 MG: 10 INJECTION, SOLUTION INTRAVENOUS at 08:04

## 2017-04-17 RX ADMIN — SERTRALINE HYDROCHLORIDE 25 MG: 25 TABLET ORAL at 08:04

## 2017-04-17 RX ADMIN — FUROSEMIDE 40 MG: 40 TABLET ORAL at 08:04

## 2017-04-17 NOTE — PROGRESS NOTES
[facility] Medicine  Progress Note    Patient Name: Oral Harrington  MRN: [id number]  Patient Class: IP- Inpatient   Admission Date: 4/13/2017  Length of Stay: 3 days  Attending Physician: Russel Blanchard MD  Primary Care Provider: No primary care provider on file.        Subjective:     Principal Problem:Cocaine intoxication with complication    HPI:  Oral Harrington is a 51 y.o. black man with cigarette smoking and cocaine abuse. He lives in [place], [place].     He was taken to [facility] ED on 4/13/17 in respiratory distress with tachycardia and hypertension. He required BiPAP in the ambulance. Upon arrival, he was intubated and put on propofol. Chest x-ray showed diffuse bilateral airspace opacities. Labs showed respiratory acidosis with hypercapnia (pH 7.092, pCO2 82.6, bicarbonate 19), renal dysfunction (BUN 21, creatinine 1.7), elevated transaminases (, ), elevated troponin (0.099) and BNP (298). Urine toxicology was positive for cocaine. EKG showed no ST elevations. He was put on midazolam drip to treat cocaine intoxication. He was admitted to [facility].       Hospital Course:       Interval History: Feeling better today. Almost back to his normal self. Still with a little EVANS. Urinating well.     Review of Systems   Cardiovascular: Negative for chest pain and palpitations.   Gastrointestinal: Negative for nausea and vomiting.     Objective:     Vital Signs (Most Recent):  Temp: 97.9 °F (36.6 °C) (04/16/17 2015)  Pulse: 91 (04/16/17 2015)  Resp: 18 (04/16/17 2015)  BP: 120/83 (04/16/17 2015)  SpO2: (!) 85 % (Pt. was placed back on NC @ 2lpm.) (04/16/17 1631) Vital Signs (24h Range):  Temp:  [97.7 °F (36.5 °C)-99.2 °F (37.3 °C)] 97.9 °F (36.6 °C)  Pulse:  [] 91  Resp:  [16-27] 18  SpO2:  [85 %-99 %] 85 %  BP: ()/(59-91) 120/83     Weight: 74.3 kg (163 lb 12.8 oz)  Body mass index is 24.91  kg/(m^2).    Intake/Output Summary (Last 24 hours) at 04/16/17 2250  Last data filed at 04/16/17 1800   Gross per 24 hour   Intake              100 ml   Output             1340 ml   Net            -1240 ml      Physical Exam   Constitutional: He is oriented to person, place, and time. He appears well-developed and well-nourished. No distress.   Cardiovascular: Normal rate and regular rhythm.    Murmur heard.  Pulmonary/Chest: Effort normal. No respiratory distress.   Abdominal: Soft. Bowel sounds are normal. He exhibits no distension. There is no tenderness.   Musculoskeletal: He exhibits no edema or tenderness.   Neurological: He is alert and oriented to person, place, and time.   Skin: Skin is warm and dry.   Nursing note and vitals reviewed.      Significant Labs:   CBC:   Recent Labs  Lab 04/15/17  0323 04/16/17  0319   WBC 5.85 8.93   HGB 13.0* 13.4*   HCT 38.6* 39.0*    226     CMP:   Recent Labs  Lab 04/15/17  0323 04/16/17  0319 04/16/17  1603    138 141   K 4.3 3.9 3.8    102 103   CO2 24 26 26   GLU 99 106 119*   BUN 28* 30* 27*   CREATININE 1.6* 1.5* 1.4   CALCIUM 9.0 9.1 9.5   PROT 7.2 7.5  --    ALBUMIN 3.4* 3.3*  --    BILITOT 0.6 1.1*  --    ALKPHOS 113 105  --    AST 39 59*  --    ALT 71* 58*  --    ANIONGAP 9 10 12   EGFRNONAA 49* 53* 57*       Significant Imaging: I have reviewed all pertinent imaging results/findings within the past 24 hours.    Assessment/Plan:      * Cocaine intoxication with complication  Cocaine abuse  NSTEMI  Continue midazolam. Phentolamine 5 mg IV q4h PRN for tachycardia and hypertension. Avoid beta-blockers. Encourage abstenance.     Acute hypercapnic respiratory failure  Acute pulmonary edema  Improved with diuresis. Appreciate Pulmonary assistance. Still requiring a little supplemental oxygen. Wean as able.     Acute systolic congestive heart failure  Transition to oral furosemide, Echocardiogram with significant findings. Likely cocaine and/or BP  induced. Daily weights. Low salt diet when able. Appreicate Cardiology assistance.     Acute kidney injury  Creatinine is stable. Unknown baseline.       VTE Risk Mitigation         Ordered     enoxaparin injection 40 mg  Daily     Route:  Subcutaneous        04/14/17 0140     Medium Risk of VTE  Once      04/14/17 0140          Russel Blanchard MD  Department of Hospital Medicine   [facility]

## 2017-04-17 NOTE — ASSESSMENT & PLAN NOTE
Cocaine abuse  NSTEMI  Continue midazolam. Phentolamine 5 mg IV q4h PRN for tachycardia and hypertension. Avoid beta-blockers. Encourage abstenance.

## 2017-04-17 NOTE — DISCHARGE SUMMARY
[facility] Medicine  Discharge Summary      Patient Name: Oral Harrington  MRN: [id number]  Admission Date: 4/13/2017  Hospital Length of Stay: 3 days  Discharge Date and Time:  04/17/2017 12:09 PM  Attending Physician: Russel Blanchard MD   Discharging Provider: Russel Blanchard MD  Primary Care Provider: Primary Doctor No      HPI:   Oral Harrington is a 51 y.o. black man with cigarette smoking and cocaine abuse. He lives in [place], [place].     He was taken to [facility] ED on 4/13/17 in respiratory distress with tachycardia and hypertension. He required BiPAP in the ambulance. Upon arrival, he was intubated and put on propofol. Chest x-ray showed diffuse bilateral airspace opacities. Labs showed respiratory acidosis with hypercapnia (pH 7.092, pCO2 82.6, bicarbonate 19), renal dysfunction (BUN 21, creatinine 1.7), elevated transaminases (, ), elevated troponin (0.099) and BNP (298). Urine toxicology was positive for cocaine. EKG showed no ST elevations. He was put on midazolam drip to treat cocaine intoxication. He was admitted to [facility].       Procedure(s) (LRB):  HEART CATH-LEFT (Right)      Indwelling Lines/Drains at time of discharge:   Lines/Drains/Airways          No matching active lines, drains, or airways        Hospital Course:   Mr. Harrington was admitted to [facility] for [facility] care. He was intubated in the ER and was given furosemide for diuresis. He was able to be liberated from the ventilator on 4/15/17 and was weaned off of oxygen and back to room air. He was found to have acute systolic heart failure with LVEF of 15 to 20%. He also had likely demand ischemia with peak troponin of 0.253. Cardiology was consulted and recommended that he undergo LHC prior to discharge and this was arranged. The morning of the scheduled LHC, the decided that he did not want to do it and wanted to come back as an outpatient  in a month to do it. He was insistent on this despite discussions with myself and Cardiology. He was also seen by Dr. Locke for the substance abuse and possible depression. He was started on sertraline 50 mg daily for this. He will follow up as an outpatient with Cardiology for further scheduling of the heart cath and management of his heart failure. It was reiterated with him to avoid further cocaine use.      Consults:   Consults         Status Ordering Provider     [facility]  [facility]     Provider:  Robin Chandra MD    Completed [facility]     Inpatient consult to Psychiatry  Once     Provider:  (Not yet assigned)    Acknowledged [facility]BAL     Inpatient consult to Pulmonology  Once     Provider:  Balbir Vivas MD    Completed [facility]BAL          Significant Diagnostic Studies: Labs:   CMP   Recent Labs  Lab 04/16/17  0319 04/16/17  1603 04/17/17  0406    141 138   K 3.9 3.8 3.7    103 103   CO2 26 26 25    119* 104   BUN 30* 27* 25*   CREATININE 1.5* 1.4 1.2   CALCIUM 9.1 9.5 9.1   PROT 7.5  --  7.6   ALBUMIN 3.3*  --  3.2*   BILITOT 1.1*  --  0.9   ALKPHOS 105  --  96   AST 59*  --  53*   ALT 58*  --  49*   ANIONGAP 10 12 10   ESTGFRAFRICA >60 >60 >60   EGFRNONAA 53* 57* >60   , CBC   Recent Labs  Lab 04/16/17  0319 04/17/17  0404 04/17/17  0700   WBC 8.93 7.81 7.21   HGB 13.4* 13.3* 13.5*   HCT 39.0* 38.6* 38.8*    242 224      Troponin   Recent Labs  Lab 04/15/17  0323   TROPONINI 0.248*      Radiology: X-Ray: CXR: X-Ray Chest 1 View (CXR): There are mixed interstitial/alveolar opacities within either lung right greater than the left.  The appearance is may be minimally improved when compared to the prior study.The heart is enlarged.  An endotracheal tube and nasogastric tube are in place.    Cardiac Graphics: Echocardiogram:   2D echo with color flow doppler:   Results for orders placed or performed during the hospital  encounter of 04/13/17   2D echo with color flow doppler   Result Value Ref Range    EF 15 (A) 55 - 65    Mitral Valve Regurgitation MODERATE (A)     Est. PA Systolic Pressure 30.09     Pericardial Effusion NONE     Tricuspid Valve Regurgitation TRIVIAL        Pending Diagnostic Studies:     None        Final Active Diagnoses:    Diagnosis Date Noted POA    PRINCIPAL PROBLEM:  Cocaine intoxication with complication [F14.929] 04/14/2017 Yes    Cardiomyopathy [I42.9] 04/17/2017 Yes    Mitral regurgitation [I34.0] 04/17/2017 Yes    Cocaine abuse [F14.10] 04/14/2017 Yes     Chronic    Acute systolic congestive heart failure [I50.21] 04/14/2017 Yes    Cigarette smoker [F17.210] 04/14/2017 Yes     Chronic      Problems Resolved During this Admission:    Diagnosis Date Noted Date Resolved POA    Acute congestive heart failure [I50.9] 04/15/2017 04/17/2017 Yes    Acute pulmonary edema [J81.0] 04/15/2017 04/17/2017 Yes    Acute hypercapnic respiratory failure [J96.02] 04/14/2017 04/17/2017 Yes    Acute kidney injury [N17.9] 04/14/2017 04/17/2017 Yes    Elevated transaminase level [R74.0] 04/14/2017 04/17/2017 Yes    Demand ischemia [I24.8] 04/14/2017 04/17/2017 Yes      Acute systolic congestive heart failure  Cardiomyopathy  Mitral Regurgitation  Likely cocaine and/or BP induced. Daily weights. Low salt diet. Continue furosemide 40 mg daily, lisinopril 2.5 mg daily, carvedilol 3.125 mg BID. Appreicate Cardiology assistance. Will follow up with the [facility].     Acute kidney injury, resolved as of 4/17/2017  Creatinine is down to 1.2 today. Unknown baseline.       Demand ischemia, resolved as of 4/17/2017  Likely due to cocaine use and/or HTN. Needs [facility], but he wishes to defer to outpatient setting.         Discharged Condition: good    Disposition: Home or Self Care    Follow Up:  Follow-up Information     Follow up with [facility] On 4/20/2017.    Why:  @10:20am    Contact  information:    [address] [facility] [address]  [phone]          Patient Instructions:     Diet general   Order Specific Question Answer Comments   Na restriction, if any: 2gNa    Additional restrictions: Cardiac (Low Na/Chol)      Activity as tolerated     Call MD for:  persistent nausea and vomiting or diarrhea     Call MD for:  severe uncontrolled pain     Call MD for:  difficulty breathing or increased cough     Call MD for:  persistent dizziness, light-headedness, or visual disturbances     Call MD for:  increased confusion or weakness       Medications:  Reconciled Home Medications:   Current Discharge Medication List      START taking these medications    Details   carvedilol (COREG) 6.25 MG tablet Take 1 tablet (6.25 mg total) by mouth 2 (two) times daily.  Qty: 60 tablet, Refills: 3    Associated Diagnoses: Systolic congestive heart failure      furosemide (LASIX) 40 MG tablet Take 1 tablet (40 mg total) by mouth once daily.  Qty: 30 tablet, Refills: 3    Associated Diagnoses: Systolic congestive heart failure      lisinopril (PRINIVIL,ZESTRIL) 2.5 MG tablet Take 1 tablet (2.5 mg total) by mouth once daily.  Qty: 30 tablet, Refills: 3    Associated Diagnoses: Systolic congestive heart failure      sertraline (ZOLOFT) 50 MG tablet Take 1 tablet (50 mg total) by mouth once daily.  Qty: 30 tablet, Refills: 3    Associated Diagnoses: Depression, unspecified depression type           Time spent on the discharge of patient: 35 minutes    Russel Blanchard MD  Department of Hospital Medicine  [facility]

## 2017-04-17 NOTE — ASSESSMENT & PLAN NOTE
Cardiomyopathy  Mitral Regurgitation  Likely cocaine and/or BP induced. Daily weights. Low salt diet. Continue furosemide 40 mg daily, lisinopril 2.5 mg daily, carvedilol 3.125 mg BID. Appreicate Cardiology assistance. Will follow up with the [facility].

## 2017-04-17 NOTE — ASSESSMENT & PLAN NOTE
Cardiomyopathy  Mitral Regurgitation  Likely cocaine and/or BP induced. Daily weights. Low salt diet. Continue furosemide 40 mg daily, lisinopril 2.5 mg daily, carvedilol 3.125 mg BID. Appreicate Cardiology assistance. Will follow up with the [facility].

## 2017-04-17 NOTE — ASSESSMENT & PLAN NOTE
Acute pulmonary edema  Improved with diuresis. Appreciate Pulmonary assistance. Still requiring a little supplemental oxygen. Wean as able.

## 2017-04-17 NOTE — ASSESSMENT & PLAN NOTE
Transition to oral furosemide, Echocardiogram with significant findings. Likely cocaine and/or BP induced. Daily weights. Low salt diet when able. Appreicate Cardiology assistance.

## 2017-04-17 NOTE — PROGRESS NOTES
[facility]  Cardiology  Progress Note    Patient Name: Oral Harrington  MRN: [id number]  Admission Date: 4/13/2017  Hospital Length of Stay: 3 days  Code Status: Full Code   Attending Physician: Russel Blanchard MD   Primary Care Physician: No primary care provider on file.  Expected Discharge Date:   Principal Problem:Cocaine intoxication with complication    Subjective:     Hospital Course: Patient taken to the ED on 4/13/17 in respiratory distress with tachycardia and hypertension. EF noted severely depressed at 15-20% and elevated troponin. Cocaine positive UDS.  LHC recommended for evaluation of coronary arteries. Patient refused LHC.     Review of Systems   Cardiovascular: Negative for chest pain, dyspnea on exertion, irregular heartbeat, leg swelling, near-syncope, orthopnea, palpitations, paroxysmal nocturnal dyspnea and syncope.   Respiratory: Negative for cough, shortness of breath, sputum production and wheezing.      Objective:     Vital Signs (Most Recent):  Temp: 97.8 °F (36.6 °C) (04/17/17 0457)  Pulse: 89 (04/17/17 0457)  Resp: 18 (04/17/17 0457)  BP: 118/71 (04/17/17 0457)  SpO2: 95 % (04/17/17 0435) Vital Signs (24h Range):  Temp:  [97.7 °F (36.5 °C)-99.2 °F (37.3 °C)] 97.8 °F (36.6 °C)  Pulse:  [] 89  Resp:  [18-22] 18  SpO2:  [85 %-97 %] 95 %  BP: (112-142)/(71-91) 118/71     Weight: 67.3 kg (148 lb 6.4 oz)  Body mass index is 22.56 kg/(m^2).    SpO2: 95 %  O2 Device (Oxygen Therapy): nasal cannula      Intake/Output Summary (Last 24 hours) at 04/17/17 0955  Last data filed at 04/17/17 0700   Gross per 24 hour   Intake              250 ml   Output              750 ml   Net             -500 ml       Lines/Drains/Airways     Peripheral Intravenous Line                 Peripheral IV - Single Lumen 04/14/17 0128 Right Forearm 3 days                Physical Exam   Constitutional: He is oriented to person, place, and time. No distress.   HENT:   Head: Normocephalic and  atraumatic.   Eyes: Right eye exhibits no discharge. Left eye exhibits no discharge.   Neck: No JVD present.   Cardiovascular: Normal rate and regular rhythm.    Murmur heard.  Pulmonary/Chest: Effort normal and breath sounds normal.   Abdominal: Soft. Bowel sounds are normal.   Musculoskeletal: He exhibits no edema.   Neurological: He is alert and oriented to person, place, and time.   Skin: Skin is warm and dry. He is not diaphoretic.   Psychiatric: He has a normal mood and affect. His behavior is normal. Judgment and thought content normal.       Significant Labs:   BMP:   Recent Labs  Lab 04/16/17  0319 04/16/17  1603 04/17/17  0406    119* 104    141 138   K 3.9 3.8 3.7    103 103   CO2 26 26 25   BUN 30* 27* 25*   CREATININE 1.5* 1.4 1.2   CALCIUM 9.1 9.5 9.1   MG 2.1  --  2.1   , CBC   Recent Labs  Lab 04/16/17 0319 04/17/17  0404 04/17/17  0700   WBC 8.93 7.81 7.21   HGB 13.4* 13.3* 13.5*   HCT 39.0* 38.6* 38.8*    242 224   , Troponin No results for input(s): TROPONINI in the last 48 hours. and All pertinent lab results from the last 24 hours have been reviewed.    Significant Imaging: Echocardiogram:   2D echo with color flow doppler:   Results for orders placed or performed during the hospital encounter of 04/13/17   2D echo with color flow doppler   Result Value Ref Range    EF 15 (A) 55 - 65    Mitral Valve Regurgitation MODERATE (A)     Est. PA Systolic Pressure 30.09     Pericardial Effusion NONE     Tricuspid Valve Regurgitation TRIVIAL      Assessment and Plan:     Brief HPI: Patient seen this morning on rounds. Refusing [facility]. Wants to go home to care for his mother.     Active Diagnoses:    Diagnosis Date Noted POA    PRINCIPAL PROBLEM:  Cocaine intoxication with complication [F14.929] Psyc consulted with recs to DC patient to home once stable- not willing for rehab  04/14/2017 Yes    Acute congestive heart failure [I50.9] EF 20% per echo with moderate MR; euvolemic on  exam today; Coreg up titrated to 6.25 mg BID HR 90s-low 100s; Low dose ACEI added to regimen Cr down to 1.2; [facility] recommended but patient refused; Tolerating Lasix 40 mg p.o. Daily -6.3 L from admission; Ok to DC from cardiology standpoint to follow up in clinic in 1 week or with [facility] ([place] [place] cardiology in 1 week 04/15/2017 Yes    Cigarette smoker [F17.210] Smoking cessation encouraged 04/14/2017 Yes    Elevated Troponin Trop elevated at 0.099, 0.170, 0.253 felt to be demand in setting of cocaine use with tachycardia, hypertensive state and respiratory distress with ADHF; [facility] recommended for coronary evaluation, patient refused 04/14/2017 Yes      Problems Resolved During this Admission:    Diagnosis Date Noted Date Resolved POA       VTE Risk Mitigation         Ordered     enoxaparin injection 40 mg  Daily     Route:  Subcutaneous        04/14/17 0140     Medium Risk of VTE  Once      04/14/17 0140          Rodolfo Cheng NP  Cardiology  [facility]    I have seen the patient with Nurse Practitioner Rodolfo Cheng. I agree with her assessment and plan as written above in the note.        Robin Chandra MD, FACC, CCDS  Interventional Cardiology  [facility]

## 2017-04-17 NOTE — PROGRESS NOTES
[facility] Medicine  Progress Note    Patient Name: Oral Harrington  MRN: [id number]  Patient Class: IP- Inpatient   Admission Date: 4/13/2017  Length of Stay: 3 days  Attending Physician: Russel Blanchard MD  Primary Care Provider: Primary Doctor No      Subjective:     Principal Problem:Cocaine intoxication with complication    HPI:  Oral Harrington is a 51 y.o. black man with cigarette smoking and cocaine abuse. He lives in [place], [place].     He was taken to [facility] ED on 4/13/17 in respiratory distress with tachycardia and hypertension. He required BiPAP in the ambulance. Upon arrival, he was intubated and put on propofol. Chest x-ray showed diffuse bilateral airspace opacities. Labs showed respiratory acidosis with hypercapnia (pH 7.092, pCO2 82.6, bicarbonate 19), renal dysfunction (BUN 21, creatinine 1.7), elevated transaminases (, ), elevated troponin (0.099) and BNP (298). Urine toxicology was positive for cocaine. EKG showed no ST elevations. He was put on midazolam drip to treat cocaine intoxication. He was admitted to [facility].       Hospital Course:  Mr. Harrington was admitted to [facility] for continued care. He was intubated in the ER and was given furosemide for diuresis. He was able to be liberated from the ventilator on 4/15/17 and was weaned off of oxygen and back to room air. He was found to have acute systolic heart failure with LVEF of 15 to 20%. He also had likely demand ischemia with peak troponin of 0.253. Cardiology was consulted and recommended that he undergo LHC prior to discharge and this was arranged. The morning of the scheduled LHC, the decided that he did not want to do it and wanted to come back as an outpatient in a month to do it. He was insistent on this despite discussions with myself and Cardiology. He was also seen by Dr. Locke for the substance abuse and possible depression. He was  started on sertraline 50 mg daily for this. He will follow up as an outpatient with Cardiology for further scheduling of the heart cath and management of his heart failure. It was reiterated with him to avoid further cocaine use.     Interval History: Says that he is feeling fine. Walked around the nurses station this AM. Changed his mind about the [facility] this AM and wants to do it as an outpatient instead.     Review of Systems   Respiratory: Negative for cough and shortness of breath.    Cardiovascular: Negative for chest pain and palpitations.   Gastrointestinal: Negative for nausea and vomiting.     Objective:     Vital Signs (Most Recent):  Temp: 98.3 °F (36.8 °C) (04/17/17 1100)  Pulse: 90 (04/17/17 1100)  Resp: 19 (04/17/17 1100)  BP: 117/84 (04/17/17 1100)  SpO2: 95 % (04/17/17 0435) Vital Signs (24h Range):  Temp:  [97.7 °F (36.5 °C)-98.3 °F (36.8 °C)] 98.3 °F (36.8 °C)  Pulse:  [] 90  Resp:  [18-22] 19  SpO2:  [85 %-95 %] 95 %  BP: (112-131)/(71-84) 117/84     Weight: 67.3 kg (148 lb 6.4 oz)  Body mass index is 22.56 kg/(m^2).    Intake/Output Summary (Last 24 hours) at 04/17/17 1200  Last data filed at 04/17/17 0700   Gross per 24 hour   Intake              250 ml   Output              750 ml   Net             -500 ml      Physical Exam   Constitutional: He is oriented to person, place, and time. He appears well-developed and well-nourished. No distress.   Neurological: He is alert and oriented to person, place, and time.   Psychiatric: He has a normal mood and affect. His behavior is normal.   Nursing note and vitals reviewed.      Significant Labs:   CBC:   Recent Labs  Lab 04/16/17  0319 04/17/17  0404 04/17/17  0700   WBC 8.93 7.81 7.21   HGB 13.4* 13.3* 13.5*   HCT 39.0* 38.6* 38.8*    242 224     CMP:   Recent Labs  Lab 04/16/17  0319 04/16/17  1603 04/17/17  0406    141 138   K 3.9 3.8 3.7    103 103   CO2 26 26 25    119* 104   BUN 30* 27* 25*   CREATININE 1.5* 1.4 1.2    CALCIUM 9.1 9.5 9.1   PROT 7.5  --  7.6   ALBUMIN 3.3*  --  3.2*   BILITOT 1.1*  --  0.9   ALKPHOS 105  --  96   AST 59*  --  53*   ALT 58*  --  49*   ANIONGAP 10 12 10   EGFRNONAA 53* 57* >60       Significant Imaging: I have reviewed all pertinent imaging results/findings within the past 24 hours.    Assessment/Plan:      * Cocaine intoxication with complication  Cocaine abuse  Encourage abstenance.     Acute hypercapnic respiratory failure  Acute pulmonary edema  Improved with diuresis. On room air now.     Acute systolic congestive heart failure  Cardiomyopathy  Mitral Regurgitation  Likely cocaine and/or BP induced. Daily weights. Low salt diet. Continue furosemide 40 mg daily, lisinopril 2.5 mg daily, carvedilol 3.125 mg BID. Appreicate Cardiology assistance. Will [facility].     Acute kidney injury  Creatinine is down to 1.2 today. Unknown baseline.       Demand ischemia  Likely due to cocaine use and/or HTN. Needs [facility], but he wishes to defer to outpatient setting.       VTE Risk Mitigation         Ordered     enoxaparin injection 40 mg  Daily     Route:  Subcutaneous        04/14/17 0140     Medium Risk of VTE  Once      04/14/17 0140        Time Spent:  I spent 35 minutes on this discharge, which includes examination, reviewing hospital course with patient/family, reviewing discharge medications and arranging follow-up care.      Russel Blanchard MD  Department of Hospital Medicine   [facility]

## 2017-04-17 NOTE — NURSING
Pt IV and tele box removed. PT given instructions on diet, mediations and follow up care. Pt verbalized understanding of above instructions. No distress noted. prescriptions being delivered to bedside.

## 2017-04-17 NOTE — CONSULTS
[facility]  Cardiology  Consult Note    Patient Name: Oral Harrington  MRN: [id number]  Admission Date: 4/13/2017  Hospital Length of Stay: 3 days  Code Status: Full Code   Attending Provider: Russel Blanchard MD   Consulting Provider: AUSTIN Burger ANP  Primary Care Physician: No primary care provider on file.  Principal Problem:Cocaine intoxication with complication    Inpatient consult to [facility]  Consult performed by: TOPHER TEJADA  Consult ordered by: JANA CUTLER  Reason for consult: chest pain         Subjective:     See full consult note under progress note section dated 4/14/2017 at 1244pm  Assessment and Plan:

## 2017-04-17 NOTE — SUBJECTIVE & OBJECTIVE
Interval History: Feeling better today. Almost back to his normal self. Still with a little EVANS. Urinating well.     Review of Systems   Cardiovascular: Negative for chest pain and palpitations.   Gastrointestinal: Negative for nausea and vomiting.     Objective:     Vital Signs (Most Recent):  Temp: 97.9 °F (36.6 °C) (04/16/17 2015)  Pulse: 91 (04/16/17 2015)  Resp: 18 (04/16/17 2015)  BP: 120/83 (04/16/17 2015)  SpO2: (!) 85 % (Pt. was placed back on NC @ 2lpm.) (04/16/17 1631) Vital Signs (24h Range):  Temp:  [97.7 °F (36.5 °C)-99.2 °F (37.3 °C)] 97.9 °F (36.6 °C)  Pulse:  [] 91  Resp:  [16-27] 18  SpO2:  [85 %-99 %] 85 %  BP: ()/(59-91) 120/83     Weight: 74.3 kg (163 lb 12.8 oz)  Body mass index is 24.91 kg/(m^2).    Intake/Output Summary (Last 24 hours) at 04/16/17 2250  Last data filed at 04/16/17 1800   Gross per 24 hour   Intake              100 ml   Output             1340 ml   Net            -1240 ml      Physical Exam   Constitutional: He is oriented to person, place, and time. He appears well-developed and well-nourished. No distress.   Cardiovascular: Normal rate and regular rhythm.    Murmur heard.  Pulmonary/Chest: Effort normal. No respiratory distress.   Abdominal: Soft. Bowel sounds are normal. He exhibits no distension. There is no tenderness.   Musculoskeletal: He exhibits no edema or tenderness.   Neurological: He is alert and oriented to person, place, and time.   Skin: Skin is warm and dry.   Nursing note and vitals reviewed.      Significant Labs:   CBC:   Recent Labs  Lab 04/15/17  0323 04/16/17 0319   WBC 5.85 8.93   HGB 13.0* 13.4*   HCT 38.6* 39.0*    226     CMP:   Recent Labs  Lab 04/15/17  0323 04/16/17  0319 04/16/17  1603    138 141   K 4.3 3.9 3.8    102 103   CO2 24 26 26   GLU 99 106 119*   BUN 28* 30* 27*   CREATININE 1.6* 1.5* 1.4   CALCIUM 9.0 9.1 9.5   PROT 7.2 7.5  --    ALBUMIN 3.4* 3.3*  --    BILITOT 0.6 1.1*  --    ALKPHOS 113 105  --     AST 39 59*  --    ALT 71* 58*  --    ANIONGAP 9 10 12   EGFRNONAA 49* 53* 57*       Significant Imaging: I have reviewed all pertinent imaging results/findings within the past 24 hours.

## 2017-04-17 NOTE — PROGRESS NOTES
.Pharmacy New Medication Education    Patient accepted medication education.    Pharmacy educated patient on the following medications, using the teach-back method.   Apap  Coreg  Lovenox  Pepcid  Lasix  Phentolamine  Sertraline    Learners of pharmacy medication education included:  patient    Patient +/- learner response:  verbalize understanding

## 2017-04-17 NOTE — DISCHARGE INSTRUCTIONS
HEART FAILURE, DISCHARGE INSTRUCTIONS FOR (ENGLISH) View Edit Remove   HEART FAILURE, COPING WITH (ENGLISH) View Edit Remove   HEART FAILURE: WARNING SIGNS OF A FLARE-UP (ENGLISH) View Edit Remove   SERTRALINE TABLETS (ENGLISH) View Edit Remove   FUROSEMIDE TABLETS (ENGLISH) View Edit Remove   CARVEDILOL TABLETS (ENGLISH) View Edit Remove   LISINOPRIL TABLETS (ENGLISH) View Edit Remove

## 2017-04-17 NOTE — PLAN OF CARE
Follow-up With  Details  Why  Contact Info   [facility]  On 4/20/2017  @10:20am  [address] [facility] [address]  [phone]        04/17/17 1208   Final Note   Assessment Type Final Discharge Note   Discharge Disposition Home   Discharge planning education complete? Yes   What phone number can be called within the next 1-3 days to see how you are doing after discharge? [phone]   Hospital Follow Up  Appt(s) scheduled? Yes   Discharge plans and expectations educations in teach back method with documentation complete? Yes   Offered [facility] -- Bedside Delivery? Yes   Discharge/Hospital Encounter Summary to ([facility]) PCP Yes  (sent to [facility])   Referral to Outpatient Case Management complete? No   Referral to / orders for Home Health Complete? n/a   30 day supply of medicines given at discharge, if documented non-compliance / non-adherence? No   Any social issues identified prior to discharge? No   Did you assess the readiness or willingness of the family or caregiver to support self management of care? n/a   Right Care Referral Info   Post Acute Recommendation No Care   Gokul Shane RN  Transitional Navigator/Case Management  [phone]

## 2017-04-17 NOTE — PLAN OF CARE
Problem: Patient Care Overview  Goal: Plan of Care Review  Outcome: Ongoing (interventions implemented as appropriate)  Transferred from ICU yesterday.  For cardiac cath today.  Bilateral wrist and groins clipped.  Fall precautions explained and maintained, bed alarm on.  No signs of infection.  Telemetry with NSR.  Will monitor VS, telemetry, labs and medications as ordered.

## 2017-04-17 NOTE — SUBJECTIVE & OBJECTIVE
Interval History: Says that he is feeling fine. Walked around the nurses station this AM. Changed his mind about the [facility] this AM and wants to do it as an outpatient instead.     Review of Systems   Respiratory: Negative for cough and shortness of breath.    Cardiovascular: Negative for chest pain and palpitations.   Gastrointestinal: Negative for nausea and vomiting.     Objective:     Vital Signs (Most Recent):  Temp: 98.3 °F (36.8 °C) (04/17/17 1100)  Pulse: 90 (04/17/17 1100)  Resp: 19 (04/17/17 1100)  BP: 117/84 (04/17/17 1100)  SpO2: 95 % (04/17/17 0435) Vital Signs (24h Range):  Temp:  [97.7 °F (36.5 °C)-98.3 °F (36.8 °C)] 98.3 °F (36.8 °C)  Pulse:  [] 90  Resp:  [18-22] 19  SpO2:  [85 %-95 %] 95 %  BP: (112-131)/(71-84) 117/84     Weight: 67.3 kg (148 lb 6.4 oz)  Body mass index is 22.56 kg/(m^2).    Intake/Output Summary (Last 24 hours) at 04/17/17 1200  Last data filed at 04/17/17 0700   Gross per 24 hour   Intake              250 ml   Output              750 ml   Net             -500 ml      Physical Exam   Constitutional: He is oriented to person, place, and time. He appears well-developed and well-nourished. No distress.   Neurological: He is alert and oriented to person, place, and time.   Psychiatric: He has a normal mood and affect. His behavior is normal.   Nursing note and vitals reviewed.      Significant Labs:   CBC:   Recent Labs  Lab 04/16/17  0319 04/17/17  0404 04/17/17  0700   WBC 8.93 7.81 7.21   HGB 13.4* 13.3* 13.5*   HCT 39.0* 38.6* 38.8*    242 224     CMP:   Recent Labs  Lab 04/16/17  0319 04/16/17  1603 04/17/17  0406    141 138   K 3.9 3.8 3.7    103 103   CO2 26 26 25    119* 104   BUN 30* 27* 25*   CREATININE 1.5* 1.4 1.2   CALCIUM 9.1 9.5 9.1   PROT 7.5  --  7.6   ALBUMIN 3.3*  --  3.2*   BILITOT 1.1*  --  0.9   ALKPHOS 105  --  96   AST 59*  --  53*   ALT 58*  --  49*   ANIONGAP 10 12 10   EGFRNONAA 53* 57* >60       Significant Imaging: I have  reviewed all pertinent imaging results/findings within the past 24 hours.

## 2017-04-17 NOTE — PSYCH
"IDENTIFICATION DATA:  This is a 51-year-old, , -American male   who was brought to ER due to cocaine intoxication and complications.  The   patient was seen at the request of attending  for psych evaluation.  The patient is   not on a PEC status.    CHIEF COMPLAINT:  "I used cocaine on Tuesday and was exhausted and then passed   out on Thursday."    HISTORY OF PRESENT ILLNESS:  According to notes, the patient was brought in by   Pulmonary Medicine due to respiratory distress.  He was positive for cocaine. He is now on    Precedex and oxygen supplement.  He was intubated due to shortness of breath.    The patient states that he has depression for a long time, but it escalated   after separation from his wife 6 years ago.  His wife is mentally ill has bipolar   illness.  The patient states that he misses her.  He believes that he   self-medicates with things including marijuana and cocaine.  He snorted little   cocaine on Tuesday and nothing happened on that day.  The patient states that on   Thursday, he was moving furniture and other heavy objects and was short-winded and brother called   EMS.  His oxygen saturation was low and required intubation.  The patient states   that his depression comes and goes and when it comes, it lasts for days, for   about a week.  During that time, he feels depressed, sad, anhedonic, tired,   hypersomnic and does not eat.  He does not get suicidal thoughts.  The patient   states that he has anxiety and gets sometimes panic.  He gets anxiety   excessively about everything, which was verified by sisters also.  He denies   hearing voices or seeing things.  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  He also   smokes weed about once a week and last use was a week ago.  His urine is   positive for cocaine only.  He experimented cocaine first time in his 20s.  He   was clean and sober for 5 months in a row in the past and he was not using   cocaine, alcohol, or marijuana around that " time.  He was drinking alcohol, but   stopped  few months ago on his own.  He denies trouble with the law.  He has some   marital problem that includes divorce and he does not want this to happen.     PAST PSYCHIATRIC HISTORY:  The patient has no previous inpatient or outpatient   psychiatric treatment.  He denies history of suicide or homicide.  He denies   trouble with the law.  He has no history of DWIs.    SOCIAL AND FAMILY HISTORY:  The patient was born and raised in [place].  He   described his childhood as not good because parents got  when he was   Young.  He is a .  He is single and has no children.  He denies family   history of mental illness, suicide or drug problem. He was   found to have possible pulmonary edema and required intubation and propofol.    ALLERGIES:  He is not allergic to any medicine or food.    He is not on any psychotropics.    MENTAL STATUS EXAMINATION:  This is a 51-year-old, tall, white male who is still   receiving oxygen by nasal cannula, exhibited fair eye contact.  He is alert,   cooperative and oriented to day, date, month and year.  Mood is depressed with   anxious to sad affect.  Psychomotor activity is decreased.  Speech is soft,   clear, normal in amount, rate and tone.  No racing thoughts, loose association   or flight of ideas noted.  He denies auditory, visual, tactile or olfactory   hallucinations.  No delusional content noted.  Insight and judgment are fair.    He is of average intelligent person.    PSYCHIATRIC DIAGNOSES:  AXIS I:  Major depressive disorder recurrent without psychotic features,   generalized anxiety disorder, cocaine abuse .  AXIS II:  No diagnosis.  AXIS III:  Pulmonary edema, ST elevation   AXIS IV:  Medical problems,  from wife, poor coping skills, drug   problem.  AXIS V:  35.    RECOMMENDATION:  We will start this patient on Zoloft 25 mg p.o. q.a.m. x2 and   50 mg p.o. q.a.m. for depression and anxiety.  The  patient is not in favor of going to   rehab as he has to care for mother as well.    ASSETS:  The patient is verbal, cooperative, motivated and has supportive   family.      JENNY  dd: 04/16/2017 14:08:56 (CDT)  td: 04/16/2017 22:39:12 (CDT)  Doc ID   #[id number]  Job ID #[id number]    CC:

## 2017-04-19 LAB
BACTERIA BLD CULT: NORMAL
BACTERIA BLD CULT: NORMAL

## 2017-04-21 NOTE — PHYSICIAN QUERY
PT Name: Oral Harrington  MR #: [id number]  Physician Query Form - CKD Clarification     CDS/: Mar Zavaleta               Contact information: [email or web address]    This form is a permanent document in the medical record.     Query Date: April 21, 2017    By submitting this query, we are merely seeking further clarification of documentation. Please utilize your independent clinical judgment when addressing the question(s) below.    The Medical record contains the following:     Indicators   Supporting Clinical Findings   Location in Medical Record   x CKD or Chronic Kidney (Renal) Failure / Disease JUAN vs. CKD-unknown baseline, but stable since admit   PN 4/15   x BUN/Creatinine                          GFR GFR=56A  Non GFR=49A Lab    Dehydration      Nausea / Vomiting      Dialysis / CRRT      Medication      Treatment      Other Chronic Conditions      Other       Provider, please further specify the stage of CKD.      [  ] Chronic Kidney Disease (CKD) (please specify stage* below)       [employer] Definitions  Stage Description  eGFR (mL/min)   [  ]     I Slight kidney damage with normal or increased filtration 90+   [ x ]     II Mildly reduced kidney function 60-89   [  ]     III Moderately reduced kidney function 30-59   [  ]     IV Severely reduced kidney function 15-29   [  ]     V Kidney failure, requiring transplant or dialysis <15     [  ] CKD on Chronic Hemodialysis (please specify stage*): __________  [  ] End Stage Renal Disease (ESRD)  [  ] Other (please specify): ________________________  [  ] Clinically Undetermined    Please document in your progress notes daily for the duration of treatment until resolved and include in your discharge summary.